# Patient Record
Sex: MALE | Race: WHITE | NOT HISPANIC OR LATINO | Employment: OTHER | URBAN - METROPOLITAN AREA
[De-identification: names, ages, dates, MRNs, and addresses within clinical notes are randomized per-mention and may not be internally consistent; named-entity substitution may affect disease eponyms.]

---

## 2018-01-09 ENCOUNTER — OFFICE VISIT (OUTPATIENT)
Dept: URGENT CARE | Facility: CLINIC | Age: 68
End: 2018-01-09
Payer: MEDICARE

## 2018-01-09 VITALS
HEART RATE: 91 BPM | WEIGHT: 195 LBS | SYSTOLIC BLOOD PRESSURE: 145 MMHG | HEIGHT: 71 IN | TEMPERATURE: 99 F | BODY MASS INDEX: 27.3 KG/M2 | DIASTOLIC BLOOD PRESSURE: 75 MMHG | RESPIRATION RATE: 18 BRPM | OXYGEN SATURATION: 97 %

## 2018-01-09 DIAGNOSIS — R52 BODY ACHES: Primary | ICD-10-CM

## 2018-01-09 DIAGNOSIS — J01.90 ACUTE BACTERIAL SINUSITIS: ICD-10-CM

## 2018-01-09 DIAGNOSIS — B96.89 ACUTE BACTERIAL SINUSITIS: ICD-10-CM

## 2018-01-09 LAB
CTP QC/QA: YES
FLUAV AG NPH QL: NEGATIVE
FLUBV AG NPH QL: NEGATIVE

## 2018-01-09 PROCEDURE — 99213 OFFICE O/P EST LOW 20 MIN: CPT | Mod: 25,S$GLB,, | Performed by: INTERNAL MEDICINE

## 2018-01-09 PROCEDURE — 96372 THER/PROPH/DIAG INJ SC/IM: CPT | Mod: S$GLB,,, | Performed by: INTERNAL MEDICINE

## 2018-01-09 PROCEDURE — 87804 INFLUENZA ASSAY W/OPTIC: CPT | Mod: QW,S$GLB,, | Performed by: INTERNAL MEDICINE

## 2018-01-09 RX ORDER — BETAMETHASONE SODIUM PHOSPHATE AND BETAMETHASONE ACETATE 3; 3 MG/ML; MG/ML
9 INJECTION, SUSPENSION INTRA-ARTICULAR; INTRALESIONAL; INTRAMUSCULAR; SOFT TISSUE ONCE
Status: COMPLETED | OUTPATIENT
Start: 2018-01-09 | End: 2018-01-09

## 2018-01-09 RX ORDER — AMOXICILLIN AND CLAVULANATE POTASSIUM 875; 125 MG/1; MG/1
1 TABLET, FILM COATED ORAL EVERY 12 HOURS
Qty: 20 TABLET | Refills: 0 | Status: SHIPPED | OUTPATIENT
Start: 2018-01-09 | End: 2018-01-19

## 2018-01-09 RX ADMIN — BETAMETHASONE SODIUM PHOSPHATE AND BETAMETHASONE ACETATE 9 MG: 3; 3 INJECTION, SUSPENSION INTRA-ARTICULAR; INTRALESIONAL; INTRAMUSCULAR; SOFT TISSUE at 07:01

## 2018-01-10 NOTE — PROGRESS NOTES
"Subjective:       Patient ID: Parvez Rossi is a 67 y.o. male.    Vitals:  height is 5' 11" (1.803 m) and weight is 88.5 kg (195 lb). His temperature is 98.5 °F (36.9 °C). His blood pressure is 145/75 (abnormal) and his pulse is 91. His respiration is 18 and oxygen saturation is 97%.     Chief Complaint: Sinus Problem    Sinus Problem   This is a new problem. The current episode started in the past 7 days. The problem has been gradually worsening since onset. There has been no fever. The pain is mild. Associated symptoms include chills, congestion, coughing, headaches, sinus pressure and sneezing. Pertinent negatives include no ear pain, hoarse voice, shortness of breath or sore throat. Past treatments include oral decongestants. The treatment provided mild relief.     Review of Systems   Constitution: Positive for chills and malaise/fatigue. Negative for fever.   HENT: Positive for congestion, sinus pressure and sneezing. Negative for ear pain, hoarse voice and sore throat.    Eyes: Negative for discharge and redness.   Cardiovascular: Negative for chest pain, dyspnea on exertion and leg swelling.   Respiratory: Positive for cough. Negative for shortness of breath, sputum production and wheezing.    Musculoskeletal: Positive for joint pain and myalgias.   Gastrointestinal: Negative for abdominal pain and nausea.   Neurological: Positive for headaches.       Objective:      Physical Exam   Constitutional: He appears well-developed and well-nourished.   HENT:   Head: Normocephalic and atraumatic.   Nose: Mucosal edema (purulent discharge) present.   Eyes: Conjunctivae and EOM are normal. Pupils are equal, round, and reactive to light.   Neck: Normal range of motion. Neck supple.   Cardiovascular: Normal rate and regular rhythm.    Pulmonary/Chest: Effort normal and breath sounds normal.   Vitals reviewed.     Assessment:       1. Body aches    2. Acute bacterial sinusitis        Plan:         Body aches  -     POCT " Influenza A/B    Acute bacterial sinusitis  -     betamethasone acetate-betamethasone sodium phosphate injection 9 mg; Inject 1.5 mLs (9 mg total) into the muscle once.  -     amoxicillin-clavulanate 875-125mg (AUGMENTIN) 875-125 mg per tablet; Take 1 tablet by mouth every 12 (twelve) hours.  Dispense: 20 tablet; Refill: 0

## 2019-03-11 DIAGNOSIS — S16.1XXA CERVICAL STRAIN: Primary | ICD-10-CM

## 2019-03-18 ENCOUNTER — CLINICAL SUPPORT (OUTPATIENT)
Dept: REHABILITATION | Facility: HOSPITAL | Age: 69
End: 2019-03-18
Payer: MEDICARE

## 2019-03-18 DIAGNOSIS — M54.2 NECK PAIN: ICD-10-CM

## 2019-03-18 DIAGNOSIS — R29.3 POOR POSTURE: ICD-10-CM

## 2019-03-18 PROCEDURE — 97110 THERAPEUTIC EXERCISES: CPT | Mod: PO

## 2019-03-18 PROCEDURE — 97161 PT EVAL LOW COMPLEX 20 MIN: CPT | Mod: PO

## 2019-03-18 PROCEDURE — G8990 OTHER PT/OT CURRENT STATUS: HCPCS | Mod: CL,PO

## 2019-03-18 PROCEDURE — 97140 MANUAL THERAPY 1/> REGIONS: CPT | Mod: PO

## 2019-03-18 PROCEDURE — G8991 OTHER PT/OT GOAL STATUS: HCPCS | Mod: CI,PO

## 2019-03-18 NOTE — PLAN OF CARE
Physical Therapy Initial Evaluation     Name: Parvez Rossi  Clinic Number: [id number]    Diagnosis:   Encounter Diagnoses   Name Primary?    Poor posture     Neck pain      Physician: Shorty Lopez MD  Treatment Orders: PT Eval and Treat  Past Medical History:   Diagnosis Date    Diabetes mellitus     Diabetes mellitus, type 2     Hyperlipidemia     Hypertension       Current Outpatient Medications   Medication Sig    fenofibrate (TRIGLIDE) 50 MG tablet Take 160 mg by mouth once daily.     fish oil-omega-3 fatty acids 300-1,000 mg capsule Take 2 g by mouth once daily.    ibuprofen (ADVIL,MOTRIN) 200 MG tablet Take 200 mg by mouth every 6 (six) hours as needed for Pain.    insulin aspart (NOVOLOG) 100 unit/mL injection Inject 70 Units into the skin 3 (three) times daily before meals.    insulin glargine (LANTUS) 100 unit/mL injection Inject 50 Units into the skin every evening.    irbesartan (AVAPRO) 75 MG tablet Take 300 mg by mouth every evening.     metformin (GLUCOPHAGE) 500 MG tablet Take 1,000 mg by mouth 4 (four) times daily.     ondansetron (ZOFRAN-ODT) 4 MG TbDL Take 2 tablets (8 mg total) by mouth every 8 (eight) hours as needed.    oxycodone-acetaminophen (PERCOCET)  mg per tablet Take 1 tablet by mouth every 4 to 6 hours as needed.     No current facility-administered medications for this visit.      Review of patient's allergies indicates:  No Known Allergies    Evaluation Date: 3-18-19  Visit # authorized: 1  Authorization period: 3-11-19 to 3-10-20  Plan of care Expiration: 5-18-19  Evaluation Time in/Out: see POC    Subjective     History of condition/Onset Date:  5 wks ago  Says he has been working out with a  for the past 3 years; started Shortly after he had his R shoulder surgery. Notes one day he was doing side planks with his , 30 sec holds, which felt fine at the time. the next day,  however, his R shoulder and whole R side of his neck was super sore. Says the pain didn't go away for about 5 days so he went to see his previous shoulder doctor. Was given cortisone injection and pain pills, none of which really helped.  Says the next week he was having R shoulder aching again, R UE numbness and severe chest pain.  Went to see his cardiologist. (this was about 3 wks ago) had an EKG which ruled out any heart event.  Went back to orthopedist again who prescribed the PT and he is getting an MRI today. Will follow up with orthopedist later this week.  Did not take any medication this morning prior to PT eval bc he didn't want to mask his symptoms.    PRECAUTIONS:  Primary complaint: R sided neck and shoulder pain  Current Functional Limitations: pain in R shoulder and neck during majority of ADLs, pain when raising arms up,   Prior Functional Status:  Easing factors:  Tramdol, advil, requires daily.  Prior Treatment: none, PT for R shoulder post RC/SAD surgery  Occupation:  Retired, used to do .                       Pain Scale 0-10:  Current: 5/10     Best: 0/10      Worst: 8/10  Pts goals:  Relieve R shoulder and neck pain    Objective     Observation/Posture: rounded shoulders, slight FHP    Palpation: significant TTP of B upper traps and levator scap (R>L), minimal TTP of suboccipitals, hypertonic  Alignment: pt tends to rotate and SB to the right when relaxed in supine. Some difficulty relaxing his head to allow for manual techs.  Comfort with gentle cervical distraction.   Hypertonic pecs  Minimal TTP of RC insertions on R side.      Cervical AROM  Motion degrees tolerance   R sidebending 10 deg  *Sig pain in R side of neck   L sidebending 20 deg *pulling pain in R side of neck   R rotation 40 deg    L rotation 35 deg    Flexion/extension 25 deg/30 Most pain with flexion       AROM Right Left Comment Normal   Shoulder Flexion: 116 degrees 135 degrees Feels better in this  position 180 deg   Shoulder Extension: WFL WFL  60 deg   Shoulder Abduction: 109 degrees 140 degrees  180 deg   Shoulder ER: T2 T4  90 deg   Shoulder IR: L1 T10 No pain, just stifffness 90 deg       MMT   Right Left Comment   Shoulder flexion: 3/5 4-/5 Pain in R UE with all MMTs   Shoulder Abduction: 3/5 4/5    Shoulder ER: 4-/5 4+/5    Shoulder IR: 4-/5 4+/5    Lower Trap: 3+/5 4/5    Middle Trap: 3+/5 4/5      Special Tests    - Neers: right negative  - Casanova-Alex: right negative  - Speeds: right negative  - Apprehension/Relocation: right negative    Pt/family was provided educational information, including: role of PT, goals for PT, scheduling - pt verbalized understanding.   Discussed insurance limitations with pt.     Functional Limitations Reports - G Codes  Category: Other PT    Tool: FOTO Cervical Survey   Modifier  Impairment Limitation Restriction    CH  0 % impaired, limited or restricted    CI  @ least 1% but less than 20% impaired, limited or restricted    CJ  @ least 20%<40% impaired, limited or restricted    CK  @ least 40%<60% impaired, limited or restricted    CL  @ least 60% <80% impaired, limited or restricted    CM  @ least 80%<100% impaired limited or restricted    CN  100% impaired, limited or restricted     Current/: CL = 65% Limitation   Goal/ : CI = 10% Limitation      History  Co-morbidities and personal factors that may impact the plan of care Co-morbidities:   diabetes, difficulty sleeping, high BMI and HTN    Personal Factors:   no deficits     low   Examination  Body Structures and Functions, activity limitations and participation restrictions that may impact the plan of care Body Regions:   neck  upper extremities    Body Systems:    gross symmetry  ROM  strength  gross coordinated movement    Participation Restrictions:   none    Activity limitations:   Learning and applying knowledge  no deficits    General Tasks and Commands  no deficits    Communication  no  deficits    Mobility  lifting and carrying objects    Self care  no deficits     Domestic Life  no deficits    Interactions/Relationships  no deficits    Life Areas  no deficits    Community and Social Life  no deficits         low   Clinical Presentation stable and uncomplicated low   Decision Making/ Complexity Score: low         TREATMENT     Time In: 0830  Time Out: 0900    PT Evaluation Completed? Yes  Educated pt on treatment goals and plan of care. Pt demo good understanding along with good return demonstration of home exercise program.      Parvez received 15 minutes of therapeutic exercise & instruction to improve LOF per flowsheet.   - HEP completed in full    NEXT VISIT:  - start with MHP to neck for 8mins prior to MT.  - supine pec stretching, advance to doorway or corner per pt tolerance.  - wall angels or ball roll ups to stretch t-spine      Parvez received 10 minutes of manual therapy including:  - grade 2-3 manual cervical distraction  - STM/TPR throughout neck and R side of neck/shoulder region      Access Code: [email or web address]   Supine Thoracic Mobilization Towel Roll Vertical with Arm Stretch - 10 reps - 10 hold - 1x daily  Supine Cervical Rotation AROM on Pillow - 10 reps - 5 hold - 2x daily  Standing Scapular Retraction - 10 reps - 10 hold - 2x daily  Patient Education  Forward Head Posture  Written Home Exercises Provided: Yes  Parvez verbalized good understanding of the education provided.   Patient demo good return demo with skill during exercises.A    Assessment     Patient displays limitations in cervicothoracic muscle tension and joint mobility. Likely strained during one of his workouts or due to general postural impairments. Pt displayed good motivation to comply with POC and responded well to today's interventions.  Pt will benefit from skilled outpatient physical therapy to address the above stated deficits, provide pt/family education and to maximize pt's level of independence.  Pt  prognosis is Excellent.      Medical necessity is demonstrated by the following IMPAIRMENTS/PROBLEMS:  - Pain limiting function  - Decreased Segmental Mobility & Decreased ROM  - Decreased Core & B UE strength  - Decreased Flexibility of neck and R UE  - Decreased Tolerance to Functional Activities  - Inability to participate in vocational pursuits  - Requires skilled supervision to complete and progress HEP    Pt's spiritual, cultural and educational needs considered and pt agreeable to plan of care and goals as stated below:     Anticipated Barriers for physical therapy: none    Short Term GOALS:  1. Pt to report average pain <5/10 with regular ADLs.   2. Pt to be able to sleep >4hrs without interruption due to pain.  3. Pt to begin HEP including specific stretching and strengthening as per their diagnosis to decrease pain and improve flexibility.  4. Pt to display >60deg cervical rotation and improvement in cervical sidebending (either in range or tolerance without pain)    Long Term GOALS:   1. Pt to report <2/10, no longer limiting functional activities.  2. Pt to report return to >75% of regular ADLs, work and recreational tasks.   3. Pt to be compliant in advanced HEP to maintain progress gained in therapy thus far.   4. Pt to display >4+/5 gross MMT for B UE and trunk in order to support head and neck throughout ADLs.   5. Pt to report resolution of headaches.    PLAN     Outpatient physical therapy 1-2 times per week x 8 weeks to include: patient education, therapeutic exercises, neuromuscular re-education, pain management/modalities prn, joint mobilizations, and regular update of pt's home exercise program.  Pt may be seen by PTA as part of the rehabilitation team.     Di Cueva DPT

## 2019-03-18 NOTE — PROGRESS NOTES
Physical Therapy Initial Evaluation     Name: Parvez Rossi  Clinic Number: [id number]    Diagnosis:   Encounter Diagnoses   Name Primary?    Poor posture     Neck pain      Physician: Shorty Lopez MD  Treatment Orders: PT Eval and Treat  Past Medical History:   Diagnosis Date    Diabetes mellitus     Diabetes mellitus, type 2     Hyperlipidemia     Hypertension       Current Outpatient Medications   Medication Sig    fenofibrate (TRIGLIDE) 50 MG tablet Take 160 mg by mouth once daily.     fish oil-omega-3 fatty acids 300-1,000 mg capsule Take 2 g by mouth once daily.    ibuprofen (ADVIL,MOTRIN) 200 MG tablet Take 200 mg by mouth every 6 (six) hours as needed for Pain.    insulin aspart (NOVOLOG) 100 unit/mL injection Inject 70 Units into the skin 3 (three) times daily before meals.    insulin glargine (LANTUS) 100 unit/mL injection Inject 50 Units into the skin every evening.    irbesartan (AVAPRO) 75 MG tablet Take 300 mg by mouth every evening.     metformin (GLUCOPHAGE) 500 MG tablet Take 1,000 mg by mouth 4 (four) times daily.     ondansetron (ZOFRAN-ODT) 4 MG TbDL Take 2 tablets (8 mg total) by mouth every 8 (eight) hours as needed.    oxycodone-acetaminophen (PERCOCET)  mg per tablet Take 1 tablet by mouth every 4 to 6 hours as needed.     No current facility-administered medications for this visit.      Review of patient's allergies indicates:  No Known Allergies    Evaluation Date: 3-18-19  Visit # authorized: 1  Authorization period: 3-11-19 to 3-10-20  Plan of care Expiration: 5-18-19  Evaluation Time in/Out: see POC    Subjective     History of condition/Onset Date:  5 wks ago  Says he has been working out with a  for the past 3 years; started Shortly after he had his R shoulder surgery. Notes one day he was doing side planks with his , 30 sec holds, which felt fine at the time. the next day,  however, his R shoulder and whole R side of his neck was super sore. Says the pain didn't go away for about 5 days so he went to see his previous shoulder doctor. Was given cortisone injection and pain pills, none of which really helped.  Says the next week he was having R shoulder aching again, R UE numbness and severe chest pain.  Went to see his cardiologist. (this was about 3 wks ago) had an EKG which ruled out any heart event.  Went back to orthopedist again who prescribed the PT and he is getting an MRI today. Will follow up with orthopedist later this week.  Did not take any medication this morning prior to PT eval bc he didn't want to mask his symptoms.    PRECAUTIONS:  Primary complaint: R sided neck and shoulder pain  Current Functional Limitations: pain in R shoulder and neck during majority of ADLs, pain when raising arms up,   Prior Functional Status:  Easing factors:  Tramdol, advil, requires daily.  Prior Treatment: none, PT for R shoulder post RC/SAD surgery  Occupation:  Retired, used to do .                       Pain Scale 0-10:  Current: 5/10     Best: 0/10      Worst: 8/10  Pts goals:  Relieve R shoulder and neck pain    Objective     Observation/Posture: rounded shoulders, slight FHP    Palpation: significant TTP of B upper traps and levator scap (R>L), minimal TTP of suboccipitals, hypertonic  Alignment: pt tends to rotate and SB to the right when relaxed in supine. Some difficulty relaxing his head to allow for manual techs.  Comfort with gentle cervical distraction.   Hypertonic pecs  Minimal TTP of RC insertions on R side.      Cervical AROM  Motion degrees tolerance   R sidebending 10 deg  *Sig pain in R side of neck   L sidebending 20 deg *pulling pain in R side of neck   R rotation 40 deg    L rotation 35 deg    Flexion/extension 25 deg/30 Most pain with flexion       AROM Right Left Comment Normal   Shoulder Flexion: 116 degrees 135 degrees Feels better in this  position 180 deg   Shoulder Extension: WFL WFL  60 deg   Shoulder Abduction: 109 degrees 140 degrees  180 deg   Shoulder ER: T2 T4  90 deg   Shoulder IR: L1 T10 No pain, just stifffness 90 deg       MMT   Right Left Comment   Shoulder flexion: 3/5 4-/5 Pain in R UE with all MMTs   Shoulder Abduction: 3/5 4/5    Shoulder ER: 4-/5 4+/5    Shoulder IR: 4-/5 4+/5    Lower Trap: 3+/5 4/5    Middle Trap: 3+/5 4/5      Special Tests    - Neers: right negative  - Casanova-Alex: right negative  - Speeds: right negative  - Apprehension/Relocation: right negative    Pt/family was provided educational information, including: role of PT, goals for PT, scheduling - pt verbalized understanding.   Discussed insurance limitations with pt.     Functional Limitations Reports - G Codes  Category: Other PT    Tool: FOTO Cervical Survey   Modifier  Impairment Limitation Restriction    CH  0 % impaired, limited or restricted    CI  @ least 1% but less than 20% impaired, limited or restricted    CJ  @ least 20%<40% impaired, limited or restricted    CK  @ least 40%<60% impaired, limited or restricted    CL  @ least 60% <80% impaired, limited or restricted    CM  @ least 80%<100% impaired limited or restricted    CN  100% impaired, limited or restricted     Current/: CL = 65% Limitation   Goal/ : CI = 10% Limitation      History  Co-morbidities and personal factors that may impact the plan of care Co-morbidities:   diabetes, difficulty sleeping, high BMI and HTN    Personal Factors:   no deficits     low   Examination  Body Structures and Functions, activity limitations and participation restrictions that may impact the plan of care Body Regions:   neck  upper extremities    Body Systems:    gross symmetry  ROM  strength  gross coordinated movement    Participation Restrictions:   none    Activity limitations:   Learning and applying knowledge  no deficits    General Tasks and Commands  no deficits    Communication  no  deficits    Mobility  lifting and carrying objects    Self care  no deficits     Domestic Life  no deficits    Interactions/Relationships  no deficits    Life Areas  no deficits    Community and Social Life  no deficits         low   Clinical Presentation stable and uncomplicated low   Decision Making/ Complexity Score: low         TREATMENT     Time In: 0830  Time Out: 0900    PT Evaluation Completed? Yes  Educated pt on treatment goals and plan of care. Pt demo good understanding along with good return demonstration of home exercise program.      Parvez received 15 minutes of therapeutic exercise & instruction to improve LOF per flowsheet.   - HEP completed in full    NEXT VISIT:  - start with MHP to neck for 8mins prior to MT.  - supine pec stretching, advance to doorway or corner per pt tolerance.  - wall angels or ball roll ups to stretch t-spine      Parvez received 10 minutes of manual therapy including:  - grade 2-3 manual cervical distraction  - STM/TPR throughout neck and R side of neck/shoulder region      Access Code: [email or web address]   Supine Thoracic Mobilization Towel Roll Vertical with Arm Stretch - 10 reps - 10 hold - 1x daily  Supine Cervical Rotation AROM on Pillow - 10 reps - 5 hold - 2x daily  Standing Scapular Retraction - 10 reps - 10 hold - 2x daily  Patient Education  Forward Head Posture  Written Home Exercises Provided: Yes  Parvez verbalized good understanding of the education provided.   Patient demo good return demo with skill during exercises.A    Assessment     Patient displays limitations in cervicothoracic muscle tension and joint mobility. Likely strained during one of his workouts or due to general postural impairments. Pt displayed good motivation to comply with POC and responded well to today's interventions.  Pt will benefit from skilled outpatient physical therapy to address the above stated deficits, provide pt/family education and to maximize pt's level of independence.  Pt  prognosis is Excellent.      Medical necessity is demonstrated by the following IMPAIRMENTS/PROBLEMS:  - Pain limiting function  - Decreased Segmental Mobility & Decreased ROM  - Decreased Core & B UE strength  - Decreased Flexibility of neck and R UE  - Decreased Tolerance to Functional Activities  - Inability to participate in vocational pursuits  - Requires skilled supervision to complete and progress HEP    Pt's spiritual, cultural and educational needs considered and pt agreeable to plan of care and goals as stated below:     Anticipated Barriers for physical therapy: none    Short Term GOALS:  1. Pt to report average pain <5/10 with regular ADLs.   2. Pt to be able to sleep >4hrs without interruption due to pain.  3. Pt to begin HEP including specific stretching and strengthening as per their diagnosis to decrease pain and improve flexibility.  4. Pt to display >60deg cervical rotation and improvement in cervical sidebending (either in range or tolerance without pain)    Long Term GOALS:   1. Pt to report <2/10, no longer limiting functional activities.  2. Pt to report return to >75% of regular ADLs, work and recreational tasks.   3. Pt to be compliant in advanced HEP to maintain progress gained in therapy thus far.   4. Pt to display >4+/5 gross MMT for B UE and trunk in order to support head and neck throughout ADLs.   5. Pt to report resolution of headaches.    PLAN     Outpatient physical therapy 1-2 times per week x 8 weeks to include: patient education, therapeutic exercises, neuromuscular re-education, pain management/modalities prn, joint mobilizations, and regular update of pt's home exercise program.  Pt may be seen by PTA as part of the rehabilitation team.     Di Cueva DPT

## 2019-03-22 ENCOUNTER — CLINICAL SUPPORT (OUTPATIENT)
Dept: REHABILITATION | Facility: HOSPITAL | Age: 69
End: 2019-03-22
Payer: MEDICARE

## 2019-03-22 DIAGNOSIS — R29.3 POOR POSTURE: ICD-10-CM

## 2019-03-22 DIAGNOSIS — M54.2 NECK PAIN: ICD-10-CM

## 2019-03-22 PROCEDURE — 97010 HOT OR COLD PACKS THERAPY: CPT | Mod: PO

## 2019-03-22 PROCEDURE — 97110 THERAPEUTIC EXERCISES: CPT | Mod: PO

## 2019-03-22 PROCEDURE — 97140 MANUAL THERAPY 1/> REGIONS: CPT | Mod: PO

## 2019-03-22 NOTE — PROGRESS NOTES
Physical Therapy Daily Treatment Note     Name: Parvez Rossi  Clinic Number: [id number]    Therapy Diagnosis:   Encounter Diagnoses   Name Primary?    Poor posture     Neck pain      Physician: Shorty Lopez MD    Visit Date: 3/22/2019  Medical Diagnosis: neck pain  Evaluation Date: 3-18-19  [place] due: 4-18-19  Authorization Period Expiration: 3-10-20  Plan of Care Certification Period: 5-18-19      Visit #/Visits authorized: 2/ pending    Time In: 0800  Time Out: 0900  Total Billable Time: 60 minutes  MT  TE  MHP    Precautions: Standard    Subjective     Pt reports: he had the MRIs done of his R shoulder and neck. His doctor reviewed them and recommended an epidural which he has not scheduled and doesn't intend to since he is feeling so much better already with therapy.    cervical MRI from 3-19-19:  Spondylosis, disc protrusion at C6-7 and C5-6  Moderate canal stenosis with cord compression deformity  B neural foraminal stenosis which is severe at C6-7, moderate at C5-6  - small disc protrusion at C4-5 with mild canal and R foraminal stenosis  Mild destroscoliosis.    R shoulder MRI from 3-19-19:  - previous RC repair with lateral anchors  - re-tear of supraspinatus, 5mm at ant rim  - tendinosis of infraspinatus  - maceration of the labrum with SLAP variant        He was compliant with home exercise program.    Patient reports their pain to be 0/10 on a 0-10 scale with 0 being no pain and 10 being the worst pain imaginable.    Functional change: notes he is feeling so much better since starting PT. Doesn't feel the need to have an epidural to his neck as the doctor suggested.      Objective   Palpation: significant TTP of B upper traps and levator scap (R>L), minimal TTP of suboccipitals, this has improved as compared to tissue quality at eval.    Alignment: pt tends to rotate and SB to the right when relaxed in supine. Some difficulty relaxing his head to allow for manual techs.-- improving  Comfort with  gentle cervical distraction.   Hypertonic pecs    Alphonse received therapeutic exercises per flowsheet to develop endurance, ROM, flexibility and posture for 15 minutes.  - HEP reviewed in full  - sitting thoracic stretching in bronze chair 10x10    OTB rows 3x10 (added to HEP)  - Upper thoracic stretch 4x10 (added to HEP)  - Scap pinches with FR vertical behind spine at wall.    NEXT VISIT:  - supine pec stretching, advance to doorway or corner per pt tolerance.  - wall angels or ball roll ups to stretch t-spine   - serratus punching  in supine 3# stick  - Standing cane ABD 10 x20     MHP applied for 10 mins prior to MT to enhance benefits.  Alphonse received the following manual therapy techniques: Joint mobilizations, Manual traction and Myofacial release were applied to the: neck and R shoulder for 15 minutes, including:  R shoulder:  - post glides grad 3, MFR to R pec minor  - distraction of GH joint per pt tolerance  Neck:   - grade 2-3 manual cervical distraction  - STM/TPR throughout neck and R side of neck/shoulder region   - scapular depression     Access Code: [email or web address]         Supine Thoracic Mobilization Towel Roll Vertical with Arm Stretch - 10 reps - 10 hold - 1x daily  Supine Cervical Rotation AROM on Pillow - 10 reps - 5 hold - 2x daily  Standing Scapular Retraction - 10 reps - 10 hold - 2x daily  Patient Education  Forward Head Posture    Written Home Exercises Provided: Patient instructed to cont prior HEP.  Exercises were reviewed and Alphonse was able to demonstrate them prior to the end of the session.  Alphonse demonstrated good  understanding of the education provided.       Assessment     Pt with great tolerance to activities today. Felt appropriate muscles activation and stretching. Felt good at end of session, no increase in pain.  Alphonse is progressing well towards his goals.   Pt prognosis is Excellent.     Pt will continue to benefit from skilled outpatient physical therapy to address the  deficits listed in the problem list box on initial evaluation, provide pt/family education and to maximize pt's level of independence in the home and community environment.     Pt's spiritual, cultural and educational needs considered and pt agreeable to plan of care and goals.    Anticipated barriers to physical therapy: none    Goals:   Short Term GOALS:  1. Pt to report average pain <5/10 with regular ADLs.   2. Pt to be able to sleep >4hrs without interruption due to pain.  3. Pt to begin HEP including specific stretching and strengthening as per their diagnosis to decrease pain and improve flexibility.  4. Pt to display >60deg cervical rotation and improvement in cervical sidebending (either in range or tolerance without pain)     Long Term GOALS:   1. Pt to report <2/10, no longer limiting functional activities.  2. Pt to report return to >75% of regular ADLs, work and recreational tasks.   3. Pt to be compliant in advanced HEP to maintain progress gained in therapy thus far.   4. Pt to display >4+/5 gross MMT for B UE and trunk in order to support head and neck throughout ADLs.   5. Pt to report resolution of headaches.    Plan   Cont per POC.    Di Cueva, PT

## 2019-03-27 ENCOUNTER — CLINICAL SUPPORT (OUTPATIENT)
Dept: REHABILITATION | Facility: HOSPITAL | Age: 69
End: 2019-03-27
Payer: MEDICARE

## 2019-03-27 DIAGNOSIS — M54.2 NECK PAIN: ICD-10-CM

## 2019-03-27 DIAGNOSIS — R29.3 POOR POSTURE: ICD-10-CM

## 2019-03-27 PROCEDURE — 97010 HOT OR COLD PACKS THERAPY: CPT | Mod: PO

## 2019-03-27 PROCEDURE — 97140 MANUAL THERAPY 1/> REGIONS: CPT | Mod: PO

## 2019-03-27 PROCEDURE — 97110 THERAPEUTIC EXERCISES: CPT | Mod: PO

## 2019-03-27 NOTE — PROGRESS NOTES
Physical Therapy Daily Treatment Note     Name: Parvez Rossi  Clinic Number: [id number]    Therapy Diagnosis:   Encounter Diagnoses   Name Primary?    Poor posture     Neck pain      Physician: Shorty Lopez MD    Visit Date: 3/27/2019  Medical Diagnosis: neck pain  Evaluation Date: 3-18-19  [place] due: 4-18-19  Authorization Period Expiration: 3-10-20  Plan of Care Certification Period: 5-18-19      Visit #/Visits authorized: 3/ pending    Time In: 1100  Time Out: 1200  Total Billable Time: 60 minutes  MT  TE  MHP    Precautions: Standard    Subjective     Pt reports:he felt great again after last session and called the doc to cancel his epidural appt.   cervical MRI from 3-19-19:  Spondylosis, disc protrusion at C6-7 and C5-6  Moderate canal stenosis with cord compression deformity  B neural foraminal stenosis which is severe at C6-7, moderate at C5-6  - small disc protrusion at C4-5 with mild canal and R foraminal stenosis  Mild destroscoliosis.    R shoulder MRI from 3-19-19:  - previous RC repair with lateral anchors  - re-tear of supraspinatus, 5mm at ant rim  - tendinosis of infraspinatus  - maceration of the labrum with SLAP variant    He was compliant with home exercise program.    Patient reports their pain to be 1/10 on a 0-10 scale with 0 being no pain and 10 being the worst pain imaginable.    Functional change: notes he is feeling so much better since starting PT. Doesn't feel the need to have an epidural to his neck as the doctor suggested.      Objective   Palpation: significant TTP of B upper traps and levator scap (R>L), minimal TTP of suboccipitals, this has improved as compared to tissue quality at eval.    Alignment: pt tends to rotate and SB to the right when relaxed in supine. Some difficulty relaxing his head to allow for manual techs.-- improving  Comfort with moderate cervical distraction.   Hypertonic beulah Cunha received therapeutic exercises per flowsheet to develop endurance, ROM,  "flexibility and posture for 23 minutes.  - supine overhead 3# red stick 10"x20  - supine T pec stretching 10x10"  - sitting thoracic stretching in bronze chair 10x10    OTB rows 3x10 (added to HEP)  - Upper thoracic stretch 4x10 (added to HEP)  - NP- Scap pinches with FR vertical behind spine at wall.    NEXT VISIT:  -  advance to doorway or corner per pt tolerance.  - wall angels or ball roll ups to stretch t-spine   - serratus punching  in supine 3# stick  - Standing cane ABD if stretches R shoulder   - sitting levator scap stretch  MHP applied for 10 mins prior to MT to enhance benefits.  Alphonse received the following manual therapy techniques: Joint mobilizations, Manual traction and Myofacial release were applied to the: neck and R shoulder for 15 minutes, including:  R shoulder:  - post glides grad 3, MFR to R pec minor  - distraction of GH joint per pt tolerance  Neck:   - grade 2-3 manual cervical distraction  - STM/TPR throughout neck and R side of neck/shoulder region   - scapular depression    Access Code: [email or web address]         Supine Thoracic Mobilization Towel Roll Vertical with Arm Stretch - 10 reps - 10 hold - 1x daily  Supine Cervical Rotation AROM on Pillow - 10 reps - 5 hold - 2x daily  Standing Scapular Retraction - 10 reps - 10 hold - 2x daily  Patient Education  Forward Head Posture    Written Home Exercises Provided: Patient instructed to cont prior HEP.  Exercises were reviewed and Alphonse was able to demonstrate them prior to the end of the session.  Alphonse demonstrated good  understanding of the education provided.       Assessment     Pt with god tolerance to tasks today. Displays improved postural awareness and scapular control during rows.  Pt with great tolerance to activities today. Felt appropriate muscles activation and stretching. Felt good at end of session, no increase in pain.  Alphonse is progressing well towards his goals.   Pt prognosis is Excellent.     Pt will continue to benefit from " skilled outpatient physical therapy to address the deficits listed in the problem list box on initial evaluation, provide pt/family education and to maximize pt's level of independence in the home and community environment.     Pt's spiritual, cultural and educational needs considered and pt agreeable to plan of care and goals.    Anticipated barriers to physical therapy: none    Goals:   Short Term GOALS:  1. Pt to report average pain <5/10 with regular ADLs.   2. Pt to be able to sleep >4hrs without interruption due to pain.  3. Pt to begin HEP including specific stretching and strengthening as per their diagnosis to decrease pain and improve flexibility.  4. Pt to display >60deg cervical rotation and improvement in cervical sidebending (either in range or tolerance without pain)     Long Term GOALS:   1. Pt to report <2/10, no longer limiting functional activities.  2. Pt to report return to >75% of regular ADLs, work and recreational tasks.   3. Pt to be compliant in advanced HEP to maintain progress gained in therapy thus far.   4. Pt to display >4+/5 gross MMT for B UE and trunk in order to support head and neck throughout ADLs.   5. Pt to report resolution of headaches.    Plan   Cont per POC.    Di Cueva, PT

## 2019-03-28 NOTE — PROGRESS NOTES
"  Physical Therapy Daily Treatment Note     Name: Parvez Rossi  Clinic Number: [id number]    Therapy Diagnosis:   Encounter Diagnoses   Name Primary?    Poor posture     Neck pain      Physician: Shorty Lopez MD    Visit Date: 3/29/2019    Physician Orders: PT Eval and Treat  Medical Diagnosis: neck pain  Evaluation Date: 3/18/19  Authorization Period Expiration: 3/10/20  Plan of Care Certification Period: 5/18/19  Visit #/Visits authorized: 4/ -    Time In: 11:00 pm  Time Out: 12:00 pm  Total Billable Time: 30 minutes (TE-1, MT-1)     Precautions: Standard    Subjective     Pt reports: he woke up with increased pain in his right shoulder that goes up is neck, stating he must have slept wrong on his shoulder.   He was compliant with home exercise program.  Response to previous treatment: no adverse effects  Functional change: none    Pain: 3/10  Location: right shoulder and neck    Objective     MHP applied for 10 mins prior to MT to enhance benefits.    Alphonse received the following manual therapy techniques: Joint mobilizations, Manual traction and Myofacial release were applied to the: neck and R shoulder for 10 minutes, including:  R shoulder:  - post glides grad 2-3, MFR to R pec minor  - distraction of GH joint per pt tolerance  Neck:   - gentle cervical distraction   - STM/TPR throughout neck and R side of neck/shoulder region   - scapular depression      Alphonse received therapeutic exercises to develop strength, endurance, ROM and posture for 40 minutes including:    - supine overhead 3# red stick 10"x20  - supine T pec stretching over towel roll 10x10"  - supine serratus punches c/ 3# dowel : 3x10  - sitting thoracic stretching in bronze chair 10x10 - NP  -Supine thoracic extensions over half foam: 3'  - Seated UT stretch: 3x30"   - OTB rows 3x10   - Upper thoracic stretch 4x10 - NP  - Scap pinches with FR vertical behind spine at wall.  - Wall angels : x15    NEXT VISIT:  -  advance to doorway or " "corner per pt tolerance.  - Standing cane ABD if stretches R shoulder    Home Exercises Provided and Patient Education Provided     Education provided:   - Pt instructed to continue prior HEP and addition of new HEP provided today    Written Home Exercises Provided: yes.  Exercises were reviewed and Alphonse was able to demonstrate them prior to the end of the session.  Alphonse demonstrated good  understanding of the education provided.     See EMR under Patient Instructions for exercises provided 3/29/2019.    Assessment     Pt was able to complete and progress today with no increased pain or adverse effects. Pt demonstrated some tightness and restrictions with wall angels and with thoracic extensions , although with subjective report of them "feeling good".  Pt reports feeling much better and having decreased pain (1/10) towards completion.   Alphonse is progressing well towards his goals.   Pt prognosis is Excellent.     Pt will continue to benefit from skilled outpatient physical therapy to address the deficits listed in the problem list box on initial evaluation, provide pt/family education and to maximize pt's level of independence in the home and community environment.   Pt's spiritual, cultural and educational needs considered and pt agreeable to plan of care and goals.    Anticipated barriers to physical therapy: none    Goals:   Short Term GOALS:  1. Pt to report average pain <5/10 with regular ADLs.   2. Pt to be able to sleep >4hrs without interruption due to pain.  3. Pt to begin HEP including specific stretching and strengthening as per their diagnosis to decrease pain and improve flexibility.  4. Pt to display >60deg cervical rotation and improvement in cervical sidebending (either in range or tolerance without pain)     Long Term GOALS:   1. Pt to report <2/10, no longer limiting functional activities.  2. Pt to report return to >75% of regular ADLs, work and recreational tasks.   3. Pt to be compliant in " advanced HEP to maintain progress gained in therapy thus far.   4. Pt to display >4+/5 gross MMT for B UE and trunk in order to support head and neck throughout ADLs.   5. Pt to report resolution of headaches.    Plan     Continue POC. Progress as tolerated.    Emeli Beltran, PTA

## 2019-03-29 ENCOUNTER — CLINICAL SUPPORT (OUTPATIENT)
Dept: REHABILITATION | Facility: HOSPITAL | Age: 69
End: 2019-03-29
Payer: MEDICARE

## 2019-03-29 DIAGNOSIS — M54.2 NECK PAIN: ICD-10-CM

## 2019-03-29 DIAGNOSIS — R29.3 POOR POSTURE: ICD-10-CM

## 2019-03-29 PROCEDURE — 97110 THERAPEUTIC EXERCISES: CPT | Mod: PO

## 2019-03-29 PROCEDURE — 97140 MANUAL THERAPY 1/> REGIONS: CPT | Mod: PO

## 2019-04-02 NOTE — PROGRESS NOTES
"  Physical Therapy Daily Treatment Note     Name: Parvez Rossi  Clinic Number: [id number]    Therapy Diagnosis:   Encounter Diagnoses   Name Primary?    Poor posture     Neck pain      Physician: Shorty Lopez MD    Visit Date: 4/3/2019    Physician Orders: PT Eval and Treat  Medical Diagnosis: neck pain  Evaluation Date: 3/18/19  Authorization Period Expiration: 3/10/20  Plan of Care Certification Period: 5/18/19  Visit #/Visits authorized: 5/20    Time In: 10:08 am  Time Out: 11:02 am   Total Billable Time: 54 minutes (TE-3, MT-1)    Precautions: Standard    Subjective     Pt reports: he is feeling alot better today with very minimal pain   He was compliant with home exercise program.  Response to previous treatment: no adverse effects  Functional change: none    Pain: 0.5/10  Location: right shoulder and neck    Objective     MHP applied for 10 mins prior to MT to enhance benefits.- NP    Alphonse received the following manual therapy techniques: Joint mobilizations, Manual traction and Myofacial release were applied to the: neck and R shoulder for 15 minutes, including:  R shoulder:  - post glides grad 2-3, MFR to R pec minor  - distraction of GH joint per pt tolerance  Neck:   - gentle cervical distraction   - STM/TPR throughout neck and R side of neck/shoulder region   - scapular depression    Alphonse received therapeutic exercises to develop strength, endurance, ROM and posture for 40 minutes including:    - supine overhead 3# red stick 10"x20  - supine T pec stretching over towel roll 10x10"  - supine serratus punches c/ 3# dowel : 3x10  - Supine AA abduction c/ dowel: x15  - Thoracic rotations: x10 B  - sitting thoracic stretching in bronze chair 10x10 - NP  - Supine thoracic extensions over half foam: 3'  - Seated UT stretch: 3x30"   - OTB rows 3x10   - Upper thoracic stretch 4x10 - NP  - Scap pinches with FR vertical behind spine at wall: 2x10  - Wall angels : x15  - Doorway stretch: 3x30"     Home " Exercises Provided and Patient Education Provided     Education provided:   - Pt instructed to continue prior HEP and addition of new HEP provided today    Written Home Exercises Provided: yes.  Exercises were reviewed and Alphonse was able to demonstrate them prior to the end of the session.  Alphonse demonstrated good  understanding of the education provided.     See EMR under Patient Instructions for exercises provided 3/29/2019.    Assessment     Pt continues to progress well and presents with decreased pain overall today. Pt denotes experiencing the most discomfort with full range overhead motions. Pt reports decreased pain towards completion.   Alphonse is progressing well towards his goals.   Pt prognosis is Excellent.     Pt will continue to benefit from skilled outpatient physical therapy to address the deficits listed in the problem list box on initial evaluation, provide pt/family education and to maximize pt's level of independence in the home and community environment.   Pt's spiritual, cultural and educational needs considered and pt agreeable to plan of care and goals.    Anticipated barriers to physical therapy: none    Goals:   Short Term GOALS:  1. Pt to report average pain <5/10 with regular ADLs.   2. Pt to be able to sleep >4hrs without interruption due to pain.  3. Pt to begin HEP including specific stretching and strengthening as per their diagnosis to decrease pain and improve flexibility.  4. Pt to display >60deg cervical rotation and improvement in cervical sidebending (either in range or tolerance without pain)     Long Term GOALS:   1. Pt to report <2/10, no longer limiting functional activities.  2. Pt to report return to >75% of regular ADLs, work and recreational tasks.   3. Pt to be compliant in advanced HEP to maintain progress gained in therapy thus far.   4. Pt to display >4+/5 gross MMT for B UE and trunk in order to support head and neck throughout ADLs.   5. Pt to report resolution of  headaches.    Plan     Continue POC. Progress as tolerated.    Emeli Beltran, PTA

## 2019-04-03 ENCOUNTER — CLINICAL SUPPORT (OUTPATIENT)
Dept: REHABILITATION | Facility: HOSPITAL | Age: 69
End: 2019-04-03
Payer: MEDICARE

## 2019-04-03 DIAGNOSIS — R29.3 POOR POSTURE: ICD-10-CM

## 2019-04-03 DIAGNOSIS — M54.2 NECK PAIN: ICD-10-CM

## 2019-04-03 PROCEDURE — 97140 MANUAL THERAPY 1/> REGIONS: CPT | Mod: PO

## 2019-04-03 PROCEDURE — 97110 THERAPEUTIC EXERCISES: CPT | Mod: PO

## 2019-04-04 NOTE — PROGRESS NOTES
"  Physical Therapy Daily Treatment Note     Name: Parvez Rossi  Clinic Number: [id number]    Therapy Diagnosis:   Encounter Diagnoses   Name Primary?    Poor posture     Neck pain      Physician: Shorty Lopez MD    Visit Date: 4/5/2019    Physician Orders: PT Eval and Treat  Medical Diagnosis: neck pain  Evaluation Date: 3/18/19  Authorization Period Expiration: 3/10/20  Plan of Care Certification Period: 5/18/19  Visit #/Visits authorized: 6/20    Time In: 10:00 am  Time Out: 10:55 am  Total Billable Time: 55 minutes (TE-3, MT-1)    Precautions: Standard    Subjective     Pt reports: he is continuing to feel better with very little pain. Pt stated he was sore the night of his last therapy session which improved and went away the next day.   He was compliant with home exercise program.  Response to previous treatment: " a little bit of soreness"   Functional change: none    Pain: 0.5 / 10  Location: right shoulder and neck    Objective     MHP applied for 10 mins prior to MT to enhance benefits.- NP    Alphonse received the following manual therapy techniques: Joint mobilizations, Manual traction and Myofacial release were applied to the: neck and R shoulder for 15 minutes, including:  R shoulder:  - post glides grad 2-3, MFR to R pec minor  - distraction of GH joint per pt tolerance  Neck:   - gentle cervical distraction   - STM/TPR throughout neck and R side of neck/shoulder region   - scapular depression    Alphonse received therapeutic exercises to develop strength, endurance, ROM and posture for 40 minutes including:    - supine overhead 3# red stick 10"x20  - supine T pec stretching over towel roll 10x10"  - supine serratus punches c/ 3# dowel : 3x10  - Supine AA abduction c/ dowel: x15  - Thoracic rotations: x15 B  - sitting thoracic stretching in bronze chair 10x10 - NP  - Supine thoracic extensions over half foam: 3'  - Seated UT stretch: 3x30"   - GTB rows 3x10   - Upper thoracic stretch 4x10 - NP  - " "Scap pinches with FR vertical behind spine at wall: 2x10  - Wall angels : x15  - Doorway stretch: 3x30"     Home Exercises Provided and Patient Education Provided     Education provided:   - Pt instructed to continue prior HEP     Written Home Exercises Provided: yes.  Exercises were reviewed and Alphonse was able to demonstrate them prior to the end of the session.  Alphonse demonstrated good  understanding of the education provided.     See EMR under Patient Instructions for exercises provided 3/29/2019.    Assessment     Pt continues to demonstrate decrease pain, good HEP compliance and good carryover with exercises with minimal to no cues required. Pt remains limited with wall angels demonstrating decreased ROM which is evident on the R compared to the left, although he is able to perform without symptom provocation. Will continue to progress as tolerated.   Alphonse is progressing well towards his goals.   Pt prognosis is Excellent.     Pt will continue to benefit from skilled outpatient physical therapy to address the deficits listed in the problem list box on initial evaluation, provide pt/family education and to maximize pt's level of independence in the home and community environment.   Pt's spiritual, cultural and educational needs considered and pt agreeable to plan of care and goals.    Anticipated barriers to physical therapy: none    Goals:   Short Term GOALS:  1. Pt to report average pain <5/10 with regular ADLs.   2. Pt to be able to sleep >4hrs without interruption due to pain.  3. Pt to begin HEP including specific stretching and strengthening as per their diagnosis to decrease pain and improve flexibility.  4. Pt to display >60deg cervical rotation and improvement in cervical sidebending (either in range or tolerance without pain)     Long Term GOALS:   1. Pt to report <2/10, no longer limiting functional activities.  2. Pt to report return to >75% of regular ADLs, work and recreational tasks.   3. Pt to " be compliant in advanced HEP to maintain progress gained in therapy thus far.   4. Pt to display >4+/5 gross MMT for B UE and trunk in order to support head and neck throughout ADLs.   5. Pt to report resolution of headaches.    Plan     Continue POC. Progress as tolerated.    Emeli Beltran, PTA

## 2019-04-05 ENCOUNTER — CLINICAL SUPPORT (OUTPATIENT)
Dept: REHABILITATION | Facility: HOSPITAL | Age: 69
End: 2019-04-05
Payer: MEDICARE

## 2019-04-05 DIAGNOSIS — R29.3 POOR POSTURE: ICD-10-CM

## 2019-04-05 DIAGNOSIS — M54.2 NECK PAIN: ICD-10-CM

## 2019-04-05 PROCEDURE — 97140 MANUAL THERAPY 1/> REGIONS: CPT | Mod: PO

## 2019-04-05 PROCEDURE — 97110 THERAPEUTIC EXERCISES: CPT | Mod: PO

## 2019-04-09 NOTE — PROGRESS NOTES
"  Physical Therapy Daily Treatment Note     Name: Parvez Rossi  Clinic Number: [id number]    Therapy Diagnosis:   Encounter Diagnoses   Name Primary?    Poor posture     Neck pain      Physician: Shorty Lopez MD    Visit Date: 4/10/2019    Physician Orders: PT Eval and Treat  Medical Diagnosis: neck pain  Evaluation Date: 3/18/19  Authorization Period Expiration: 3/10/20  Plan of Care Certification Period: 5/18/19  Visit #/Visits authorized: 7/20    Time In: 0900  Time Out: 1000  Total Billable Time: 31  minutes (TE-2, MT-1)    Precautions: Standard    Subjective     Pt reports:he is still feeling better and better gradually with therapy.  Notes he was a little sore last night after working out with his .     He was compliant with home exercise program.  Response to previous treatment: felt fine   Functional change: none    Pain: 0/ 10  Location: right shoulder and neck    Objective     Alphonse received the following manual therapy techniques: Joint mobilizations, Manual traction and Myofacial release were applied to the: neck and R shoulder for 8 minutes, including:  R shoulder:  - post glides grad 2-3, MFR to R pec minor  - distraction of GH joint per pt tolerance  Neck:   - gentle cervical distraction   - STM/TPR throughout neck and R side of neck/shoulder region   - scapular depression    Alphonse received therapeutic exercises to develop strength, endurance, ROM and posture for 23 minutes including:  - supine overhead 3# red stick 10"x20  - supine T pec stretching over half FR 2# 2x10"  - serratus wall slides 2x10  Ball roll up wall 10x10  - sitting thoracic stretching in bronze chair 10x10 - NP  NP- Supine thoracic extensions over half foam: 3'  NP- Seated UT stretch: 3x30"   - Scap pinches with FR vertical behind spine at wall: 2x10 w/shoulder flexion  - Wall angels : x15  - Doorway stretch: 3x30"       Written Home Exercises Provided: yes.  Exercises were reviewed and Alphonse was able to " demonstrate them prior to the end of the session.  Alphonse demonstrated good  understanding of the education provided.     See EMR under Patient Instructions for exercises provided 3/29/2019.    Assessment   Pt with fair + scapular control during dynamic activities.comfort with MT. He is continuing to improve with current POC.    Alphonse is progressing well towards his goals.   Pt prognosis is Excellent.     Pt will continue to benefit from skilled outpatient physical therapy to address the deficits listed in the problem list box on initial evaluation, provide pt/family education and to maximize pt's level of independence in the home and community environment.   Pt's spiritual, cultural and educational needs considered and pt agreeable to plan of care and goals.    Anticipated barriers to physical therapy: none    Goals:   Short Term GOALS:  1. Pt to report average pain <5/10 with regular ADLs.   2. Pt to be able to sleep >4hrs without interruption due to pain.  3. Pt to begin HEP including specific stretching and strengthening as per their diagnosis to decrease pain and improve flexibility.  4. Pt to display >60deg cervical rotation and improvement in cervical sidebending (either in range or tolerance without pain)     Long Term GOALS:   1. Pt to report <2/10, no longer limiting functional activities.  2. Pt to report return to >75% of regular ADLs, work and recreational tasks.   3. Pt to be compliant in advanced HEP to maintain progress gained in therapy thus far.   4. Pt to display >4+/5 gross MMT for B UE and trunk in order to support head and neck throughout ADLs.   5. Pt to report resolution of headaches.    Plan     Continue POC. Progress as tolerated.    Di Cueva, PT

## 2019-04-10 ENCOUNTER — CLINICAL SUPPORT (OUTPATIENT)
Dept: REHABILITATION | Facility: HOSPITAL | Age: 69
End: 2019-04-10
Payer: MEDICARE

## 2019-04-10 DIAGNOSIS — M54.2 NECK PAIN: ICD-10-CM

## 2019-04-10 DIAGNOSIS — R29.3 POOR POSTURE: ICD-10-CM

## 2019-04-10 PROCEDURE — 97110 THERAPEUTIC EXERCISES: CPT | Mod: PO

## 2019-04-10 PROCEDURE — 97140 MANUAL THERAPY 1/> REGIONS: CPT | Mod: PO

## 2019-04-12 ENCOUNTER — CLINICAL SUPPORT (OUTPATIENT)
Dept: REHABILITATION | Facility: HOSPITAL | Age: 69
End: 2019-04-12
Payer: MEDICARE

## 2019-04-12 DIAGNOSIS — R29.3 POOR POSTURE: ICD-10-CM

## 2019-04-12 DIAGNOSIS — M54.2 NECK PAIN: ICD-10-CM

## 2019-04-12 PROCEDURE — 97110 THERAPEUTIC EXERCISES: CPT | Mod: PO

## 2019-04-12 PROCEDURE — 97140 MANUAL THERAPY 1/> REGIONS: CPT | Mod: PO

## 2019-04-12 PROCEDURE — 97010 HOT OR COLD PACKS THERAPY: CPT | Mod: PO

## 2019-04-12 NOTE — PROGRESS NOTES
"  Physical Therapy Daily Treatment Note     Name: Parvez Rossi  Clinic Number: [id number]    Therapy Diagnosis:   Encounter Diagnoses   Name Primary?    Poor posture     Neck pain      Physician: Shorty Loepz MD    Visit Date: 4/12/2019    Physician Orders: PT Eval and Treat  Medical Diagnosis: neck pain  Evaluation Date: 3/18/19  Authorization Period Expiration: 3/10/20  Plan of Care Certification Period: 5/18/19  Visit #/Visits authorized: 7/20    Time In: 0900  Time Out: 1000  Total Billable Time: 60  minutes (TE-2, MT-1)    Precautions: Standard    Subjective     Pt reports:hes feeling better everyday but has noticed he has some pain in his R shoulder even when just standing and walking like at a parade or something.      He was compliant with home exercise program.  Response to previous treatment: felt fine   Functional change: none    Pain: 0/ 10  Location: right shoulder and neck    Objective     Alphonse received the following manual therapy techniques: Joint mobilizations, Manual traction and Myofacial release were applied to the: neck and R shoulder for15 minutes, including:  R shoulder:  - post glides grad 2-3, MFR to R pec minor  - distraction of GH joint per pt tolerance  Neck:   - gentle cervical distraction   - STM/TPR throughout neck and R side of neck/shoulder region   - scapular depression    Alphonse received therapeutic exercises to develop strength, endurance, ROM and posture for 25 minutes including:    Scap pinches with FR vertical behind spine at wall: 2x10 w/shoulder flexion  - Wall angels : x15  - Doorway stretch: 3x30"    - supine overhead 3# red stick 10"x20  -NP - supine T pec stretching over half FR 2# 2x10"  - serratus wall slides 3x10  - Ball roll up wall 30x10  - sitting thoracic stretching in bronze chair 10x10 - NP  NP- Supine thoracic extensions over half foam: 3'         Written Home Exercises Provided: yes.  Exercises were reviewed and Alphonse was able to demonstrate them prior " to the end of the session.  Alphonse demonstrated good  understanding of the education provided.     See EMR under Patient Instructions for exercises provided 3/29/2019.    Assessment   Pt improved scapular control and tolerance during dynamic activities today .comfort with MT. He is continuing to improve with current POC.    Alphonse is progressing well towards his goals.   Pt prognosis is Excellent.     Pt will continue to benefit from skilled outpatient physical therapy to address the deficits listed in the problem list box on initial evaluation, provide pt/family education and to maximize pt's level of independence in the home and community environment.   Pt's spiritual, cultural and educational needs considered and pt agreeable to plan of care and goals.    Anticipated barriers to physical therapy: none    Goals:   Short Term GOALS:  1. Pt to report average pain <5/10 with regular ADLs.   2. Pt to be able to sleep >4hrs without interruption due to pain.  3. Pt to begin HEP including specific stretching and strengthening as per their diagnosis to decrease pain and improve flexibility.  4. Pt to display >60deg cervical rotation and improvement in cervical sidebending (either in range or tolerance without pain)     Long Term GOALS:   1. Pt to report <2/10, no longer limiting functional activities.  2. Pt to report return to >75% of regular ADLs, work and recreational tasks.   3. Pt to be compliant in advanced HEP to maintain progress gained in therapy thus far.   4. Pt to display >4+/5 gross MMT for B UE and trunk in order to support head and neck throughout ADLs.   5. Pt to report resolution of headaches.    Plan     Continue POC. Progress as tolerated.    Di Cueva, PT

## 2019-04-15 ENCOUNTER — CLINICAL SUPPORT (OUTPATIENT)
Dept: REHABILITATION | Facility: HOSPITAL | Age: 69
End: 2019-04-15
Payer: MEDICARE

## 2019-04-15 DIAGNOSIS — M54.2 NECK PAIN: ICD-10-CM

## 2019-04-15 DIAGNOSIS — R29.3 POOR POSTURE: ICD-10-CM

## 2019-04-15 PROCEDURE — 97140 MANUAL THERAPY 1/> REGIONS: CPT | Mod: PO

## 2019-04-15 PROCEDURE — 97110 THERAPEUTIC EXERCISES: CPT | Mod: PO

## 2019-04-15 NOTE — PROGRESS NOTES
"  Physical Therapy Daily Treatment Note/PROGRESS REPORT     Name: Parvez Rossi  Clinic Number: [id number]    Therapy Diagnosis:   Encounter Diagnoses   Name Primary?    Poor posture     Neck pain      Physician: Shorty Lopez MD    Visit Date: 4/15/2019    Physician Orders: PT Eval and Treat  Medical Diagnosis: neck pain  Evaluation Date: 3/18/19  [place] completed on 4-15-19  Authorization Period Expiration: 3/10/20  Plan of Care Certification Period: 5/18/19  Visit #/Visits authorized: 8/20    Time In: 1600  Time Out: 1700  Total Billable Time: 55 minutes (TE-3, MT-1)    Precautions: Standard    Subjective     Pt reports: he had te achy pain in his R shoulder when standing at the [facility] again but he was able to implement to scap retraction as instructed at least session and that helped some.    He was compliant with home exercise program.  Response to previous treatment: felt fine   Functional change: no neck pain when driving or sleeping anymore.  Pain: 0/ 10  Pain at worst in last week: 4/10  usually doesn't have any pain these days.  Location: right shoulder and neck    Objective   Cervical ROM:  Flexion 55 slight sharp pain in CT junction  Ext 38  SBending R=33 deg; left=  30deg  L rotation= 52 deg  R rotation= 75 deg      Alphonse received the following manual therapy techniques: Joint mobilizations, Manual traction and Myofacial release were applied to the: neck and R shoulder for 15 minutes, including:  R shoulder:  - post glides grad 2-3, MFR to R pec minor  - distraction of GH joint per pt tolerance  Neck:   - gentle cervical distraction   - STM/TPR throughout neck and R side of neck/shoulder region   - scapular depression    Alphonse received therapeutic exercises to develop strength, endurance, ROM and posture for 40 minutes including:  Half FR stretching to warm up:  - supine overhead 3# red stick 10"x20  - supine T pec stretching over half FR 2# 2x10"   Supine thoracic extensions over half foam: 3'    Scap " "pinches with FR vertical behind spine at wall: 2x10 w/shoulder flexion.  - Wall angels : 2x15  OTB rows 3x10  YTB shoulder ext 3x10  - Doorway stretch: 3x30"  - serratus wall slides 3x10  - Ball roll up wall 30x10  - sitting thoracic stretching in bronze chair 10x10      Written Home Exercises Provided: yes.  Exercises were reviewed and Alphonse was able to demonstrate them prior to the end of the session.  Alphonse demonstrated good  understanding of the education provided.     See EMR under Patient Instructions for exercises provided 3/29/2019.    Assessment   Pt has improved very well with PT thus far. He still displays minor ROM restriction fo rhis neck and is complaining of R shoulder pain that would benefit from continued PT interventions. Will schdeule out trough remainder of POC, pt in agreemnt and is motivated to cont per POC.    Alphonse is progressing well towards his goals.   Pt prognosis is Excellent.     Pt will continue to benefit from skilled outpatient physical therapy to address the deficits listed in the problem list box on initial evaluation, provide pt/family education and to maximize pt's level of independence in the home and community environment.   Pt's spiritual, cultural and educational needs considered and pt agreeable to plan of care and goals.    Anticipated barriers to physical therapy: none    Goals:   Short Term GOALS:  1. Pt to report average pain <5/10 with regular ADLs. MET 4-15-19  2. Pt to be able to sleep >4hrs without interruption due to pain. MET 4-15-19  3. Pt to begin HEP including specific stretching and strengthening as per their diagnosis to decrease pain and improve flexibility. MET 4-15-19  4. Pt to display >60deg cervical rotation and improvement in cervical sidebending (either in range or tolerance without pain) PARTIALLY MET 4-15-19     Long Term GOALS:   1. Pt to report <2/10, no longer limiting functional activities.  2. Pt to report return to >75% of regular ADLs, work " and recreational tasks.   3. Pt to be compliant in advanced HEP to maintain progress gained in therapy thus far.   4. Pt to display >4+/5 gross MMT for B UE and trunk in order to support head and neck throughout ADLs.   5. Pt to report resolution of headaches.    Plan     Continue POC. Progress as tolerated.    Di Cueva, PT

## 2019-04-16 NOTE — PLAN OF CARE
"  Physical Therapy Daily Treatment Note/PROGRESS REPORT     Name: Parvez Rossi  Clinic Number: [id number]    Therapy Diagnosis:   Encounter Diagnoses   Name Primary?    Poor posture     Neck pain      Physician: Shorty Lopez MD    Visit Date: 4/15/2019    Physician Orders: PT Eval and Treat  Medical Diagnosis: neck pain  Evaluation Date: 3/18/19  DC completed on 4-15-19  Authorization Period Expiration: 3/10/20  Plan of Care Certification Period: 5/18/19  Visit #/Visits authorized: 8/20    Time In: 1600  Time Out: 1700  Total Billable Time: 55 minutes (TE-3, MT-1)    Precautions: Standard    Subjective     Pt reports: he had te achy pain in his R shoulder when standing at the [facility] again but he was able to implement to scap retraction as instructed at least session and that helped some.    He was compliant with home exercise program.  Response to previous treatment: felt fine   Functional change: no neck pain when driving or sleeping anymore.  Pain: 0/ 10  Pain at worst in last week: 4/10  usually doesn't have any pain these days.  Location: right shoulder and neck    Objective   Cervical ROM:  Flexion 55 slight sharp pain in CT junction  Ext 38  SBending R=33 deg; left=  30deg  L rotation= 52 deg  R rotation= 75 deg      Alphonse received the following manual therapy techniques: Joint mobilizations, Manual traction and Myofacial release were applied to the: neck and R shoulder for 15 minutes, including:  R shoulder:  - post glides grad 2-3, MFR to R pec minor  - distraction of GH joint per pt tolerance  Neck:   - gentle cervical distraction   - STM/TPR throughout neck and R side of neck/shoulder region   - scapular depression    Alphonse received therapeutic exercises to develop strength, endurance, ROM and posture for 40 minutes including:  Half FR stretching to warm up:  - supine overhead 3# red stick 10"x20  - supine T pec stretching over half FR 2# 2x10"   Supine thoracic extensions over half foam: 3'    Scap " "pinches with FR vertical behind spine at wall: 2x10 w/shoulder flexion.  - Wall angels : 2x15  OTB rows 3x10  YTB shoulder ext 3x10  - Doorway stretch: 3x30"  - serratus wall slides 3x10  - Ball roll up wall 30x10  - sitting thoracic stretching in bronze chair 10x10      Written Home Exercises Provided: yes.  Exercises were reviewed and Alphonse was able to demonstrate them prior to the end of the session.  Alphonse demonstrated good  understanding of the education provided.     See EMR under Patient Instructions for exercises provided 3/29/2019.    Assessment   Pt has improved very well with PT thus far. He still displays minor ROM restriction fo rhis neck and is complaining of R shoulder pain that would benefit from continued PT interventions. Will schdeule out trough remainder of POC, pt in agreemnt and is motivated to cont per POC.    Alphonse is progressing well towards his goals.   Pt prognosis is Excellent.     Pt will continue to benefit from skilled outpatient physical therapy to address the deficits listed in the problem list box on initial evaluation, provide pt/family education and to maximize pt's level of independence in the home and community environment.   Pt's spiritual, cultural and educational needs considered and pt agreeable to plan of care and goals.    Anticipated barriers to physical therapy: none    Goals:   Short Term GOALS:  1. Pt to report average pain <5/10 with regular ADLs. MET 4-15-19  2. Pt to be able to sleep >4hrs without interruption due to pain. MET 4-15-19  3. Pt to begin HEP including specific stretching and strengthening as per their diagnosis to decrease pain and improve flexibility. MET 4-15-19  4. Pt to display >60deg cervical rotation and improvement in cervical sidebending (either in range or tolerance without pain) PARTIALLY MET 4-15-19     Long Term GOALS:   1. Pt to report <2/10, no longer limiting functional activities.  2. Pt to report return to >75% of regular ADLs, work " and recreational tasks.   3. Pt to be compliant in advanced HEP to maintain progress gained in therapy thus far.   4. Pt to display >4+/5 gross MMT for B UE and trunk in order to support head and neck throughout ADLs.   5. Pt to report resolution of headaches.    Plan     Continue POC. Progress as tolerated.    Di Cueva, PT

## 2019-04-17 ENCOUNTER — CLINICAL SUPPORT (OUTPATIENT)
Dept: REHABILITATION | Facility: HOSPITAL | Age: 69
End: 2019-04-17
Payer: MEDICARE

## 2019-04-17 DIAGNOSIS — R29.3 POOR POSTURE: ICD-10-CM

## 2019-04-17 DIAGNOSIS — M54.2 NECK PAIN: ICD-10-CM

## 2019-04-17 PROCEDURE — 97110 THERAPEUTIC EXERCISES: CPT | Mod: PO

## 2019-04-17 PROCEDURE — 97140 MANUAL THERAPY 1/> REGIONS: CPT | Mod: PO

## 2019-04-17 NOTE — PROGRESS NOTES
"  Physical Therapy Daily Treatment Note/PROGRESS REPORT     Name: Parvez Rossi  Clinic Number: [id number]    Therapy Diagnosis:   Encounter Diagnoses   Name Primary?    Poor posture     Neck pain      Physician: Shorty Lopez MD    Visit Date: 4/17/2019    Physician Orders: PT Eval and Treat  Medical Diagnosis: neck pain  Evaluation Date: 3/18/19  [place] completed on 4-15-19  Authorization Period Expiration: 3/10/20  Plan of Care Certification Period: 5/18/19  Visit #/Visits authorized: 9/20    Time In: 11:03 am   Time Out: 12:00 pm  Total Billable Time: 57 minutes (TE-3, MT-1)    Precautions: Standard    Subjective     Pt reports: he is having a little pain today which overall has improved. Pt stated if he stands for a long time it will start to bother him although he does some scap retractions and tries to remain more aware of his posture which helps with his pain.       He was compliant with home exercise program.  Response to previous treatment: felt fine   Functional change: no neck pain when driving or sleeping anymore.    Pain: 1/ 10  Location: right shoulder and neck    Objective     Alphonse received the following manual therapy techniques: Joint mobilizations, Manual traction and Myofacial release were applied to the: neck and R shoulder for 13 minutes, including:    R shoulder:  - post glides grad 2-3, MFR to R pec minor  - distraction of GH joint per pt tolerance  Neck:   - gentle cervical distraction   - STM/TPR throughout neck and R side of neck/shoulder region   - scapular depression    Alphonse received therapeutic exercises to develop strength, endurance, ROM and posture for 40 minutes including:  Half FR stretching to warm up:  - supine overhead 3# red stick 10"x20  - supine T pec stretching over half FR 2# 2x10"  - supine thoracic extensions over half foam: 3'    - Prone rows 3#: 3x10  - Prone extensions 1 #: 3x10    -Scap pinches with FR vertical behind spine at wall: 2x10 w/shoulder flexion.  - Wall " "angels : 2x15  - GTB rows 3x10  - GTB shoulder ext 3x10  - Doorway stretch: 3x30"  - serratus wall slides 3x10  - Ball roll up wall 30x10  - sitting thoracic stretching in bronze chair 10x10      Written Home Exercises Provided: pt instructed to continue previous HEP  Exercises were reviewed and Alphonse was able to demonstrate them prior to the end of the session.  Alphonse demonstrated good  understanding of the education provided.     See EMR under Patient Instructions for exercises provided 3/29/2019.    Assessment     Pt was able to progress slightly today and tolerated session well. Attempted to progress with serratus wall slides by adding a pillowcase although pt experienced some mild lateral shoulder pain with this. Pt tolerated addition of prone rows and extensions well with cueing for scapular muscle recruitment.    Alphonse is progressing well towards his goals.   Pt prognosis is Excellent.     Pt will continue to benefit from skilled outpatient physical therapy to address the deficits listed in the problem list box on initial evaluation, provide pt/family education and to maximize pt's level of independence in the home and community environment.   Pt's spiritual, cultural and educational needs considered and pt agreeable to plan of care and goals.    Anticipated barriers to physical therapy: none    Goals:   Short Term GOALS:  1. Pt to report average pain <5/10 with regular ADLs. MET 4-15-19  2. Pt to be able to sleep >4hrs without interruption due to pain. MET 4-15-19  3. Pt to begin HEP including specific stretching and strengthening as per their diagnosis to decrease pain and improve flexibility. MET 4-15-19  4. Pt to display >60deg cervical rotation and improvement in cervical sidebending (either in range or tolerance without pain) PARTIALLY MET 4-15-19     Long Term GOALS:   1. Pt to report <2/10, no longer limiting functional activities.  2. Pt to report return to >75% of regular ADLs, work and " recreational tasks.   3. Pt to be compliant in advanced HEP to maintain progress gained in therapy thus far.   4. Pt to display >4+/5 gross MMT for B UE and trunk in order to support head and neck throughout ADLs.   5. Pt to report resolution of headaches.    Plan     Continue POC. Progress as tolerated.    Emeli Beltran, PTA

## 2019-04-24 ENCOUNTER — CLINICAL SUPPORT (OUTPATIENT)
Dept: REHABILITATION | Facility: HOSPITAL | Age: 69
End: 2019-04-24
Payer: MEDICARE

## 2019-04-24 DIAGNOSIS — M54.2 NECK PAIN: ICD-10-CM

## 2019-04-24 DIAGNOSIS — R29.3 POOR POSTURE: ICD-10-CM

## 2019-04-24 PROCEDURE — 97110 THERAPEUTIC EXERCISES: CPT | Mod: PO

## 2019-04-24 NOTE — PROGRESS NOTES
"  Physical Therapy Daily Treatment Note/PROGRESS REPORT     Name: Parvez Rossi  Clinic Number: [id number]    Therapy Diagnosis:   Encounter Diagnoses   Name Primary?    Poor posture     Neck pain      Physician: Shorty Lopez MD    Visit Date: 4/24/2019    Physician Orders: PT Eval and Treat  Medical Diagnosis: neck pain  Evaluation Date: 3/18/19  [place] completed on 4-15-19  Authorization Period Expiration: 3/10/20  Plan of Care Certification Period: 5/18/19  Visit #/Visits authorized: 10 /20    Time In: 11:00 am   Time Out: 11:55  pm  Total Billable Time: 55 minutes (TE-4    Precautions: Standard    Subjective     Pt reports:   Slight increase in pain     He was compliant with home exercise program.  Response to previous treatment: felt fine   Functional change: no neck pain when driving or sleeping anymore.    Pain: 2 -3 / 10  Location: right shoulder and neck    Objective     Alphonse received the following manual therapy techniques: Joint mobilizations, Manual traction and Myofacial release were applied to the: neck and R shoulder for 10 minutes, including:    R shoulder:  - post glides grad 2-3, MFR to R pec minor  - distraction of GH joint per pt tolerance  Neck:   - gentle cervical distraction   - STM/TPR throughout neck and R side of neck/shoulder region   - scapular depression    Alphonse received therapeutic exercises to develop strength, endurance, ROM and posture for 45 minutes including:  Half FR stretching to warm up:  - supine overhead 3# red stick 10"x20  - supine T pec stretching over half FR 2# 2x10"  - supine thoracic extensions over half foam: 3'    - Prone rows 3#: 3x10  - Prone extensions 1 #: 3x10    -Scap pinches with FR vertical behind spine at wall: 2x10 w/shoulder flexion.  - Wall angels : 2x15  - GTB rows 3x10  - GTB shoulder ext 3x10  - Doorway stretch: 3x30"  - serratus wall slides 3x10  - Ball roll up wall 30x10  - sitting thoracic stretching in bronze chair 10x10      Written Home " Exercises Provided: pt instructed to continue previous HEP  Exercises were reviewed and Alphonse was able to demonstrate them prior to the end of the session.  Alphonse demonstrated good  understanding of the education provided.     See EMR under Patient Instructions for exercises provided 3/29/2019.    Assessment     Patient tolerated therapy session well. Good form / technique noted with all exercises. Fatigues quickly requiring several rest breaks throughout session  Alphonse is progressing well towards his goals.   Pt prognosis is Excellent.     Pt will continue to benefit from skilled outpatient physical therapy to address the deficits listed in the problem list box on initial evaluation, provide pt/family education and to maximize pt's level of independence in the home and community environment.   Pt's spiritual, cultural and educational needs considered and pt agreeable to plan of care and goals.    Anticipated barriers to physical therapy: none    Goals:   Short Term GOALS:  1. Pt to report average pain <5/10 with regular ADLs. MET 4-15-19  2. Pt to be able to sleep >4hrs without interruption due to pain. MET 4-15-19  3. Pt to begin HEP including specific stretching and strengthening as per their diagnosis to decrease pain and improve flexibility. MET 4-15-19  4. Pt to display >60deg cervical rotation and improvement in cervical sidebending (either in range or tolerance without pain) PARTIALLY MET 4-15-19     Long Term GOALS:   1. Pt to report <2/10, no longer limiting functional activities.  2. Pt to report return to >75% of regular ADLs, work and recreational tasks.   3. Pt to be compliant in advanced HEP to maintain progress gained in therapy thus far.   4. Pt to display >4+/5 gross MMT for B UE and trunk in order to support head and neck throughout ADLs.   5. Pt to report resolution of headaches.    Plan     Continue POC. Progress as tolerated.    Tessa Payton, PTA

## 2019-05-01 ENCOUNTER — CLINICAL SUPPORT (OUTPATIENT)
Dept: REHABILITATION | Facility: HOSPITAL | Age: 69
End: 2019-05-01
Payer: MEDICARE

## 2019-05-01 DIAGNOSIS — R29.3 POOR POSTURE: ICD-10-CM

## 2019-05-01 DIAGNOSIS — M54.2 NECK PAIN: ICD-10-CM

## 2019-05-01 PROCEDURE — 97110 THERAPEUTIC EXERCISES: CPT | Mod: PO

## 2019-05-01 NOTE — PROGRESS NOTES
"  Physical Therapy Daily Treatment Note     Name: Parvez Rossi  Clinic Number: [id number]    Therapy Diagnosis:   Encounter Diagnoses   Name Primary?    Poor posture     Neck pain      Physician: Shorty Lopez MD    Visit Date: 5/1/2019    Physician Orders: PT Eval and Treat  Medical Diagnosis: neck pain  Evaluation Date: 3/18/19  [place] completed on 4-15-19  Authorization Period Expiration: 3/10/20  Plan of Care Certification Period: 5/18/19  Visit #/Visits authorized: 11/20    Time In: 10:00 am   Time Out: 11:00am  Total Billable Time: 30minutes (TE-2    Precautions: Standard    Subjective     Pt reports: his neck is feeling all the way better and he only has occasional R shoulder pain which he assumes is from his previous RC tear. Knows that his doctor said that his shoulder pain should decrease as the neck got better. Is comfortable with today being his last day of therapy.    He was compliant with home exercise program.  Response to previous treatment: felt fine   Functional change: no neck pain when driving or sleeping anymore.    Pain: 2 -3 / 10  Location: right shoulder and neck    Objective     Pt with full cervical AROM WFL without pain.    Alphonse received therapeutic exercises to develop strength, endurance, ROM and posture for 30minutes including:  Half FR stretching to warm up:  - supine overhead 3# red stick 10"x20  - supine T pec stretching over half FR 2# 2x10"  - Prone rows 6#: 3x10  - Prone extensions 2#: 3x10  -Scap pinches with FR vertical behind spine at wall: 2x10 w/shoulder flexion.  - Wall angels with liftoff : 2x10  - BTB rows 3x10  - GTB shoulder ext 3x10  NP- Doorway stretch: 3x30"  - serratus wall slides 3x10  - Ball roll up wall 30x10  NP- sitting thoracic stretching in bronze chair 10x10      Written Home Exercises Provided: pt instructed to continue previous HEP  Exercises were reviewed and Alphonse was able to demonstrate them prior to the end of the session.  Alphonse demonstrated good  " understanding of the education provided.     See EMR under Patient Instructions for exercises provided 3/29/2019.    Assessment     Patient with good progress in therapy thus far. He is compliant and has advanced with his HEP. Would like to trial a few weeks indep with his HEP to manage and prevent neck and R shoulder pain. Will follow up as needed. Pt in agreement.   Alphonse is progressing well towards his goals.   Pt prognosis is Excellent.     Pt will continue to benefit from skilled outpatient physical therapy to address the deficits listed in the problem list box on initial evaluation, provide pt/family education and to maximize pt's level of independence in the home and community environment.   Pt's spiritual, cultural and educational needs considered and pt agreeable to plan of care and goals.    Anticipated barriers to physical therapy: none    Goals:   Short Term GOALS:  1. Pt to report average pain <5/10 with regular ADLs. MET 4-15-19  2. Pt to be able to sleep >4hrs without interruption due to pain. MET 4-15-19  3. Pt to begin HEP including specific stretching and strengthening as per their diagnosis to decrease pain and improve flexibility. MET 4-15-19  4. Pt to display >60deg cervical rotation and improvement in cervical sidebending (either in range or tolerance without pain) PARTIALLY MET 4-15-19     Long Term GOALS:   1. Pt to report <2/10, no longer limiting functional activities. MET 5-1-19  2. Pt to report return to >75% of regular ADLs, work and recreational tasks. MET 5-1-19  3. Pt to be compliant in advanced HEP to maintain progress gained in therapy thus far. MET 5-1-19  4. Pt to display >4+/5 gross MMT for B UE and trunk in order to support head and neck throughout ADLs. MET 5-1-19  5. Pt to report resolution of headaches.MET 5-1-19    Plan     Continue POC. Progress as tolerated.    Di Cueva, PT

## 2019-08-29 DIAGNOSIS — M79.671 RIGHT FOOT PAIN: Primary | ICD-10-CM

## 2019-09-26 ENCOUNTER — OFFICE VISIT (OUTPATIENT)
Dept: UROLOGY | Facility: CLINIC | Age: 69
End: 2019-09-26
Payer: MEDICARE

## 2019-09-26 VITALS — WEIGHT: 195.13 LBS | HEIGHT: 71 IN | RESPIRATION RATE: 16 BRPM | BODY MASS INDEX: 27.32 KG/M2

## 2019-09-26 DIAGNOSIS — N13.8 BPH WITH URINARY OBSTRUCTION: ICD-10-CM

## 2019-09-26 DIAGNOSIS — N40.1 BPH WITH URINARY OBSTRUCTION: ICD-10-CM

## 2019-09-26 DIAGNOSIS — N50.82 SCROTAL PAIN: Primary | ICD-10-CM

## 2019-09-26 PROCEDURE — 99999 PR PBB SHADOW E&M-EST. PATIENT-LVL III: CPT | Mod: PBBFAC,,, | Performed by: UROLOGY

## 2019-09-26 PROCEDURE — 99204 OFFICE O/P NEW MOD 45 MIN: CPT | Mod: S$GLB,,, | Performed by: UROLOGY

## 2019-09-26 PROCEDURE — 1101F PR PT FALLS ASSESS DOC 0-1 FALLS W/OUT INJ PAST YR: ICD-10-PCS | Mod: CPTII,S$GLB,, | Performed by: UROLOGY

## 2019-09-26 PROCEDURE — 99204 PR OFFICE/OUTPT VISIT, NEW, LEVL IV, 45-59 MIN: ICD-10-PCS | Mod: S$GLB,,, | Performed by: UROLOGY

## 2019-09-26 PROCEDURE — 99999 PR PBB SHADOW E&M-EST. PATIENT-LVL III: ICD-10-PCS | Mod: PBBFAC,,, | Performed by: UROLOGY

## 2019-09-26 PROCEDURE — 1101F PT FALLS ASSESS-DOCD LE1/YR: CPT | Mod: CPTII,S$GLB,, | Performed by: UROLOGY

## 2019-09-26 NOTE — PROGRESS NOTES
Subjective:       Patient ID: Parvez Rossi is a 69 y.o. male.    Chief Complaint: testaglia (r testicle x 1 week)    HPI   Parvez Rossi is a 69 y.o. male with a PMHx of DM and HTN who presents to the clinic for evaluation of right testicular pain. AUA Sx score is an 8. His onset of pain is a week ago, describing the pain as dull during the day and worsening at night when he lays down. He denies burning, trauma, or other urinary symptoms. He denies a FHx of prostate CA.      Past Medical History:   Diagnosis Date    Diabetes mellitus     Diabetes mellitus, type 2     Hyperlipidemia     Hypertension        Past Surgical History:   Procedure Laterality Date    HERNIA REPAIR      SMALL INTESTINE SURGERY      TONSILLECTOMY         History reviewed. No pertinent family history.    Social History     Socioeconomic History    Marital status: Single     Spouse name: Not on file    Number of children: Not on file    Years of education: Not on file    Highest education level: Not on file   Occupational History    Not on file   Social Needs    Financial resource strain: Not on file    Food insecurity:     Worry: Not on file     Inability: Not on file    Transportation needs:     Medical: Not on file     Non-medical: Not on file   Tobacco Use    Smoking status: Never Smoker    Smokeless tobacco: Never Used   Substance and Sexual Activity    Alcohol use: No    Drug use: No    Sexual activity: Not on file   Lifestyle    Physical activity:     Days per week: Not on file     Minutes per session: Not on file    Stress: Not on file   Relationships    Social connections:     Talks on phone: Not on file     Gets together: Not on file     Attends [employer] service: Not on file     Active member of club or organization: Not on file     Attends meetings of clubs or organizations: Not on file     Relationship status: Not on file   Other Topics Concern    Not on file   Social History Narrative    Not on file        Allergies:  Patient has no known allergies.    Medications:    Current Outpatient Medications:     empagliflozin (JARDIANCE ORAL), Take by mouth., Disp: , Rfl:     fenofibrate (TRIGLIDE) 50 MG tablet, Take 160 mg by mouth once daily. , Disp: , Rfl:     fish oil-omega-3 fatty acids 300-1,000 mg capsule, Take 2 g by mouth once daily., Disp: , Rfl:     ibuprofen (ADVIL,MOTRIN) 200 MG tablet, Take 200 mg by mouth every 6 (six) hours as needed for Pain., Disp: , Rfl:     insulin aspart (NOVOLOG) 100 unit/mL injection, Inject 70 Units into the skin 3 (three) times daily before meals., Disp: , Rfl:     insulin glargine (LANTUS) 100 unit/mL injection, Inject 50 Units into the skin every evening., Disp: , Rfl:     irbesartan (AVAPRO) 75 MG tablet, Take 300 mg by mouth every evening. , Disp: , Rfl:     metformin (GLUCOPHAGE) 500 MG tablet, Take 1,000 mg by mouth 4 (four) times daily. , Disp: , Rfl:     ondansetron (ZOFRAN-ODT) 4 MG TbDL, Take 2 tablets (8 mg total) by mouth every 8 (eight) hours as needed. (Patient not taking: Reported on 9/26/2019), Disp: 10 tablet, Rfl: 1    oxycodone-acetaminophen (PERCOCET)  mg per tablet, Take 1 tablet by mouth every 4 to 6 hours as needed. (Patient not taking: Reported on 9/26/2019), Disp: 50 tablet, Rfl: 0    Review of Systems   Constitutional: Negative for activity change, appetite change, chills, diaphoresis, fatigue, fever and unexpected weight change.   HENT: Negative for congestion, dental problem, hearing loss, mouth sores, postnasal drip, rhinorrhea, sinus pressure and trouble swallowing.    Eyes: Negative for pain, discharge and itching.   Respiratory: Negative for apnea, cough, choking, chest tightness, shortness of breath and wheezing.    Cardiovascular: Negative for chest pain, palpitations and leg swelling.   Gastrointestinal: Negative for abdominal distention, abdominal pain, anal bleeding, blood in stool, constipation, diarrhea, nausea, rectal pain  and vomiting.   Endocrine: Negative for polydipsia and polyuria.   Genitourinary: Positive for testicular pain (right). Negative for decreased urine volume, difficulty urinating, discharge, dysuria, enuresis, flank pain, frequency, genital sores, hematuria, penile pain, penile swelling, scrotal swelling and urgency.   Musculoskeletal: Negative for arthralgias and back pain.   Skin: Negative for color change, rash and wound.   Neurological: Negative for dizziness, syncope, speech difficulty, light-headedness and headaches.   Hematological: Negative for adenopathy. Does not bruise/bleed easily.   Psychiatric/Behavioral: Negative for behavioral problems and confusion.       Objective:      Physical Exam   Constitutional: He appears well-developed.   HENT:   Head: Normocephalic.   Neck: Neck supple.   Cardiovascular: Normal rate.    Pulmonary/Chest: Effort normal.   Abdominal: Soft.   Genitourinary:   Genitourinary Comments: Prostate was smooth without nodularity. No rectal masses.  25 grams, benign. Normal perineum.    No testicular masses palpated   Neurological: He is alert.   Skin: Skin is warm.     Psychiatric: He has a normal mood and affect.     no hernias  Assessment:       1. Scrotal pain    2. BPH with urinary obstruction        Plan:       Parvez was seen today for testaglia.    Diagnoses and all orders for this visit:    Scrotal pain  -     US Scrotum And Testicles; Future    BPH with urinary obstruction  -     Prostate Specific Antigen, Diagnostic; Future          Order PSA today.  Recommended taking an anti-inflammatory.  Schedule US Scrotum and Testicle    ICris, am acting as a scribe on this patient encounter in the presence and under the supervision of Dr. Ortiz.    09/26/2019 7:40 AM    I, Dr. Ortiz, personally performed the services described in this documentation.   All medical record entries made by the scribe were at my direction and in my presence.   I have reviewed the chart and  agree that the record is accurate and complete.   Rodrigo Ortiz MD.  7:40 AM 09/26/2019

## 2019-09-27 ENCOUNTER — HOSPITAL ENCOUNTER (OUTPATIENT)
Dept: RADIOLOGY | Facility: HOSPITAL | Age: 69
Discharge: HOME | End: 2019-09-27
Attending: UROLOGY
Payer: MEDICARE

## 2019-09-27 DIAGNOSIS — N50.82 SCROTAL PAIN: ICD-10-CM

## 2019-09-27 PROCEDURE — 76870 US EXAM SCROTUM: CPT | Mod: TC

## 2019-09-27 PROCEDURE — 76870 US EXAM SCROTUM: CPT | Mod: 26,,, | Performed by: RADIOLOGY

## 2019-09-27 PROCEDURE — 76870 US SCROTUM AND TESTICLES: ICD-10-PCS | Mod: 26,,, | Performed by: RADIOLOGY

## 2019-09-30 ENCOUNTER — TELEPHONE (OUTPATIENT)
Dept: UROLOGY | Facility: CLINIC | Age: 69
End: 2019-09-30

## 2019-09-30 NOTE — TELEPHONE ENCOUNTER
----- Message from Prudencio Yap sent at 9/30/2019  2:28 PM CDT -----  Pt calling to find out if his ultrasound results are in.    [phone]

## 2019-10-02 ENCOUNTER — TELEPHONE (OUTPATIENT)
Dept: UROLOGY | Facility: CLINIC | Age: 69
End: 2019-10-02

## 2019-10-02 DIAGNOSIS — N50.89 SCROTAL MASS: Primary | ICD-10-CM

## 2019-10-02 NOTE — TELEPHONE ENCOUNTER
----- Message from Rodrigo Ortiz Jr., MD sent at 10/2/2019 10:11 AM CDT -----  fred varicocoele and epi cyst  Needs ct stone study

## 2019-10-07 ENCOUNTER — HOSPITAL ENCOUNTER (OUTPATIENT)
Dept: RADIOLOGY | Facility: HOSPITAL | Age: 69
Discharge: HOME | End: 2019-10-07
Attending: UROLOGY
Payer: MEDICARE

## 2019-10-07 DIAGNOSIS — N50.89 SCROTAL MASS: ICD-10-CM

## 2019-10-07 PROCEDURE — 74176 CT RENAL STONE STUDY ABD PELVIS WO: ICD-10-PCS | Mod: 26,,, | Performed by: RADIOLOGY

## 2019-10-07 PROCEDURE — 74176 CT ABD & PELVIS W/O CONTRAST: CPT | Mod: 26,,, | Performed by: RADIOLOGY

## 2019-10-07 PROCEDURE — 74176 CT ABD & PELVIS W/O CONTRAST: CPT | Mod: TC

## 2019-10-10 ENCOUNTER — TELEPHONE (OUTPATIENT)
Dept: UROLOGY | Facility: CLINIC | Age: 69
End: 2019-10-10

## 2019-10-10 NOTE — TELEPHONE ENCOUNTER
----- Message from Anita Dunne MA sent at 10/10/2019  8:23 AM CDT -----  Contact: [phone]  Type:  Test Results    Who Called: Pt    Name of Test (Lab/Mammo/Etc): Ct Scan     Date of Test: 10/7/19    Ordering Provider: Dr Ortiz     [facility]     Would the patient rather a call back or a response via [facility]? Call Back     Best Call Back Number:  [phone]

## 2019-10-10 NOTE — TELEPHONE ENCOUNTER
P/ dr nicholas    ?liver mass vs artifact  Tony small renal stones  Needs to see pcp for f/u liver ct     Pt notified and agreeable. He will contact pcp

## 2020-01-02 LAB
EXT 24 HR UR METANEPHRINE: ABNORMAL
EXT 24 HR UR NORMETANEPHRINE: ABNORMAL
EXT 24 HR UR NORMETANEPHRINE: ABNORMAL
EXT 25 HYDROXY VIT D2: ABNORMAL
EXT 25 HYDROXY VIT D3: ABNORMAL
EXT 5 HIAA 24 HR URINE: ABNORMAL
EXT 5 HIAA BLOOD: ABNORMAL
EXT ACTH: ABNORMAL
EXT AFP: ABNORMAL
EXT ALBUMIN: 4.3 (ref 3.5–5)
EXT ALKALINE PHOSPHATASE: 49 (ref 38–126)
EXT ALT: 30 (ref 7–56)
EXT AMYLASE: ABNORMAL
EXT ANTI ISLET CELL AB: ABNORMAL
EXT ANTI PARIETAL CELL AB: ABNORMAL
EXT ANTI THYROID AB: ABNORMAL
EXT AST: 19 (ref 7–40)
EXT BILIRUBIN DIRECT: ABNORMAL
EXT BILIRUBIN TOTAL: 0.5 (ref 0–1.2)
EXT BK VIRUS DNA QN PCR: ABNORMAL
EXT BUN: 20 (ref 7–21)
EXT C PEPTIDE: ABNORMAL
EXT CA 125: ABNORMAL
EXT CA 19-9: ABNORMAL
EXT CA 27-29: ABNORMAL
EXT CALCITONIN: ABNORMAL
EXT CALCIUM: 9.7 (ref 8.5–10.3)
EXT CEA: ABNORMAL
EXT CHLORIDE: 98 (ref 98–107)
EXT CHOLESTEROL: 119 (ref 100–200)
EXT CHROMOGRANIN A: ABNORMAL
EXT CO2: 26 (ref 21–31)
EXT CREATININE UA: ABNORMAL
EXT CREATININE: 1 MG/DL (ref 0.7–1.2)
EXT CYCLOSPORONE LEVEL: ABNORMAL
EXT DOPAMINE: ABNORMAL
EXT EBV DNA BY PCR: ABNORMAL
EXT EPINEPHRINE: ABNORMAL
EXT FOLATE: ABNORMAL
EXT FREE T3: ABNORMAL
EXT FREE T4: ABNORMAL
EXT FSH: ABNORMAL
EXT GASTRIN RELEASING PEPTIDE: ABNORMAL
EXT GASTRIN RELEASING PEPTIDE: ABNORMAL
EXT GASTRIN: ABNORMAL
EXT GGT: ABNORMAL
EXT GHRELIN: ABNORMAL
EXT GLUCAGON: ABNORMAL
EXT GLUCOSE: 259 (ref 70–100)
EXT GROWTH HORMONE: ABNORMAL
EXT HCV RNA QUANT PCR: ABNORMAL
EXT HDL: 30 (ref 40–75)
EXT HEMATOCRIT: 42.1 (ref 40–52)
EXT HEMOGLOBIN A1C: ABNORMAL
EXT HEMOGLOBIN: 14.4 (ref 13.6–17.5)
EXT HISTAMINE 24 HR URINE: ABNORMAL
EXT HISTAMINE: ABNORMAL
EXT IGF-1: ABNORMAL
EXT IMMUNKNOW (NON-STIMULATED): ABNORMAL
EXT IMMUNKNOW (STIMULATED): ABNORMAL
EXT INR: 1 (ref 0.8–1.2)
EXT INSULIN: ABNORMAL
EXT LANREOTIDE LEVEL: ABNORMAL
EXT LDH, TOTAL: ABNORMAL
EXT LDL CHOLESTEROL: 55 (ref 0–130)
EXT LIPASE: ABNORMAL
EXT MAGNESIUM: ABNORMAL
EXT METANEPHRINE FREE PLASMA: ABNORMAL
EXT MOTILIN: ABNORMAL
EXT NEUROKININ A CAMB: ABNORMAL
EXT NEUROKININ A ISI: ABNORMAL
EXT NEUROTENSIN: ABNORMAL
EXT NOREPINEPHRINE: ABNORMAL
EXT NORMETANEPHRINE: ABNORMAL
EXT NSE: ABNORMAL
EXT OCTREOTIDE LEVEL: ABNORMAL
EXT PANCREASTATIN CAMB: ABNORMAL
EXT PANCREASTATIN ISI: ABNORMAL
EXT PANCREATIC POLYPEPTIDE: ABNORMAL
EXT PHOSPHORUS: ABNORMAL
EXT PLATELETS: 234 (ref 150–350)
EXT POTASSIUM: 5 (ref 3.5–5)
EXT PROGRAF LEVEL: ABNORMAL
EXT PROLACTIN: ABNORMAL
EXT PROTEIN TOTAL: 8 (ref 6.3–8.2)
EXT PROTEIN UA: ABNORMAL
EXT PT: 13.4 (ref 12.3–14.7)
EXT PTH, INTACT: ABNORMAL
EXT PTT: 29.2 (ref 22.7–33.4)
EXT RAPAMUNE LEVEL: ABNORMAL
EXT SEROTONIN: ABNORMAL
EXT SODIUM: 137 MMOL/L (ref 135–145)
EXT SOMATOSTATIN: ABNORMAL
EXT SUBSTANCE P: ABNORMAL
EXT TRIGLYCERIDES: 211 (ref 30–150)
EXT TRYPTASE: ABNORMAL
EXT TSH: ABNORMAL
EXT URIC ACID: ABNORMAL
EXT URINE AMYLASE U/HR: ABNORMAL
EXT URINE AMYLASE U/L: ABNORMAL
EXT VASOACTIVE INTESTINAL POLYPEPTIDE: ABNORMAL
EXT VITAMIN B12: ABNORMAL
EXT VMA 24 HR URINE: ABNORMAL
EXT WBC: 4.3 (ref 4.5–11)
NEURON SPECIFIC ENOLASE: ABNORMAL

## 2020-01-20 ENCOUNTER — TELEPHONE (OUTPATIENT)
Dept: NEUROLOGY | Facility: HOSPITAL | Age: 70
End: 2020-01-20

## 2020-01-21 ENCOUNTER — TELEPHONE (OUTPATIENT)
Dept: NEUROLOGY | Facility: HOSPITAL | Age: 70
End: 2020-01-21

## 2020-01-29 NOTE — PROGRESS NOTES
"[facility]AN[facility]:  [facility]  A collaboration between [facility] and [facility]      PATIENT: Parvez Rossi  MRN: [id number]  DATE: 1/30/2020    Subjective:      Chief Complaint: Liver Mass    He initially presented to the urologist with a persistent right scrotal pain. A CT scan noncontrast was obtained which showed a possible lesion in the liver which led to MRI of the abdomen showing of the lesion.  Biopsy of the lesion was consistent with adenocarcinoma.  He also underwent endoscopic ultrasound of the pancreas and a cystic lesion was biopsied which was negative for cancer.  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 Next for he complains of tiredness however does not have any significant problem.  He has lost over 5 lb with the colonoscopy prep.  He is able to manage the blood sugar better with this insulin dosages.  He recently went on a trip with his friend to [facility] for 10 days      Vitals: Blood pressure 130/76, pulse 79, temperature 97.8 °F (36.6 °C), temperature source Oral, height 5' 11" (1.803 m), weight 85.4 kg (188 lb 4.4 oz). -    ECOG Score: 0 - Asymptomatic    Diagnosis: No diagnosis found.     Interval History:       Oncologic History:   Oncologic History    Oncologic Treatment    Pathology      Past Medical History:  Past Medical History:   Diagnosis Date    Diabetes mellitus     Diabetes mellitus, type 2     Hyperlipidemia     Hypertension        Past Surgical History:  Past Surgical History:   Procedure Laterality Date    HERNIA REPAIR      SMALL INTESTINE SURGERY      TONSILLECTOMY         Family History:  History reviewed. No pertinent family history.    Allergies:  Patient has no known allergies.    Medications:   Current Outpatient Medications   Medication Sig    aspirin (ECOTRIN) 81 MG EC tablet Take 81 mg by " mouth.    empagliflozin (JARDIANCE ORAL) Take by mouth.    fenofibrate (TRIGLIDE) 50 MG tablet Take 160 mg by mouth once daily.     fenofibrate 160 MG Tab     fish oil-omega-3 fatty acids 300-1,000 mg capsule Take 2 g by mouth once daily.    ibuprofen (ADVIL,MOTRIN) 200 MG tablet Take 200 mg by mouth every 6 (six) hours as needed for Pain.    insulin asp prt-insulin aspart, NOVOLOG 70/30, (NOVOLOG 70/30) 100 unit/mL (70-30) Soln     insulin aspart (NOVOLOG) 100 unit/mL injection Inject 70 Units into the skin 3 (three) times daily before meals.    insulin glargine (LANTUS) 100 unit/mL injection Inject 50 Units into the skin every evening.    irbesartan (AVAPRO) 75 MG tablet Take 300 mg by mouth every evening.     LEVEMIR U-100 INSULIN 100 unit/mL injection     metformin (GLUCOPHAGE) 500 MG tablet Take 1,000 mg by mouth 4 (four) times daily.     omega 3-dha-epa-fish oil 1,000 mg (120 mg-180 mg) Cap Take 2 capsules by mouth.    ondansetron (ZOFRAN-ODT) 4 MG TbDL Take 2 tablets (8 mg total) by mouth every 8 (eight) hours as needed.    oxycodone-acetaminophen (PERCOCET)  mg per tablet Take 1 tablet by mouth every 4 to 6 hours as needed.     No current facility-administered medications for this visit.         Review of Systems   Constitutional: Positive for fatigue. Negative for activity change, appetite change, chills, diaphoresis, fever and unexpected weight change.   HENT: Negative for dental problem, ear discharge, ear pain, facial swelling, hearing loss, mouth sores, postnasal drip, sinus pressure, sneezing, sore throat, tinnitus and trouble swallowing.    Eyes: Negative for photophobia, discharge, redness, itching and visual disturbance.   Respiratory: Positive for cough. Negative for apnea, choking, chest tightness, shortness of breath, wheezing and stridor.    Cardiovascular: Negative for chest pain, palpitations and leg swelling.   Gastrointestinal: Negative for abdominal distention, abdominal  pain, anal bleeding, blood in stool, constipation, diarrhea, nausea, rectal pain and vomiting.   Endocrine: Negative.    Genitourinary: Negative.    Musculoskeletal: Negative for arthralgias, back pain, gait problem, joint swelling, myalgias and neck stiffness.   Skin: Negative for pallor and rash.   Allergic/Immunologic: Negative.    Neurological: Negative for tremors, seizures, syncope, facial asymmetry, weakness, light-headedness, numbness and headaches.   Hematological: Negative for adenopathy.   Psychiatric/Behavioral: Negative for behavioral problems, confusion and hallucinations.      Objective:      Physical Exam   Constitutional: He is oriented to person, place, and time. He appears well-developed and well-nourished. No distress.   HENT:   Head: Normocephalic and atraumatic.   Right Ear: External ear normal.   Left Ear: External ear normal.   Eyes: Pupils are equal, round, and reactive to light. Conjunctivae and EOM are normal.   Neck: Normal range of motion. Neck supple.   Cardiovascular: Normal rate and regular rhythm.   Pulmonary/Chest: Effort normal and breath sounds normal.   Abdominal: Soft. Bowel sounds are normal.   Musculoskeletal: Normal range of motion.   Neurological: He is alert and oriented to person, place, and time.   Skin: Skin is warm and dry. He is not diaphoretic.   Psychiatric: He has a normal mood and affect. His behavior is normal.      Assessment:       No diagnosis found.    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  He needs a right hepatic lobectomy.    Had a long discussion about the surgery the recovery time.  I informed him it is a major surgery he may end up losing about 15-20 lb weight after the surgery I talked about the risk of surgery such as bleeding infection DVT PE MI stroke bile leak following surgery requiring ERCP percutaneous drainages.    I informed him most likely this may be a  primary liver tumor arising from the peripheral bile duct however since it is a possibility of this being metastatic.  The final resection make provide more answers    I ordered for some basic hepatitis BC panel I will see him again next week.  I emphasized the importance of nutrition physical activities    He is seen the cardiologist on Monday.  Will repeat the CT of the chest to rule out any lung primary      Plan:       Long discussion about the surgery the recovery risks and benefits  Will see him next week  He will require right hepatic lobectomy    Talked about the surgery recovery time what to expect after the surgery.          SMITH Tran MD, FACS   Associate Professor of Surgery, [facility]   Neuroendocrine Surgery, Hepatic/Pancreatic & General Surgery   [address], [address]   [place] [address]   ph. [phone]; [phone]   fax. [phone]

## 2020-01-30 ENCOUNTER — OFFICE VISIT (OUTPATIENT)
Dept: NEUROLOGY | Facility: HOSPITAL | Age: 70
End: 2020-01-30
Attending: SURGERY
Payer: MEDICARE

## 2020-01-30 VITALS
TEMPERATURE: 98 F | HEIGHT: 71 IN | WEIGHT: 188.25 LBS | DIASTOLIC BLOOD PRESSURE: 76 MMHG | SYSTOLIC BLOOD PRESSURE: 130 MMHG | HEART RATE: 79 BPM | BODY MASS INDEX: 26.35 KG/M2

## 2020-01-30 DIAGNOSIS — C22.1 CHOLANGIOCARCINOMA: Primary | ICD-10-CM

## 2020-01-30 PROCEDURE — 99213 OFFICE O/P EST LOW 20 MIN: CPT | Performed by: SURGERY

## 2020-01-30 RX ORDER — FENOFIBRATE 160 MG/1
TABLET ORAL
COMMUNITY
Start: 2019-12-24

## 2020-01-30 RX ORDER — GLUCOSAM/CHONDRO/HERB 149/HYAL 750-100 MG
2 TABLET ORAL
COMMUNITY

## 2020-01-30 RX ORDER — INSULIN ASPART 100 [IU]/ML
INJECTION, SUSPENSION SUBCUTANEOUS
COMMUNITY
Start: 2015-03-03

## 2020-01-30 RX ORDER — INSULIN DETEMIR 100 [IU]/ML
INJECTION, SOLUTION SUBCUTANEOUS
COMMUNITY
Start: 2019-12-24

## 2020-01-30 RX ORDER — ASPIRIN 81 MG/1
81 TABLET ORAL
COMMUNITY

## 2020-01-30 NOTE — PATIENT INSTRUCTIONS
Your Lab work is today at outpatient diagnostic 1st floor      Your next appointment with Dr. Pro 2/6/20 at 1100 am    You have orders to get CT of chest and Abdomen

## 2020-02-03 ENCOUNTER — TELEPHONE (OUTPATIENT)
Dept: NEUROLOGY | Facility: HOSPITAL | Age: 70
End: 2020-02-03

## 2020-02-03 NOTE — TELEPHONE ENCOUNTER
----- Message from Ramona Stephens sent at 2/3/2020  7:48 AM CST -----  Contact: [place]   Because our doctor ordered it.  ----- Message -----  From: Tori Urias LPN  Sent: 1/31/2020   3:17 PM CST  To: Ramona Stephens    Why can't [facility] get there own authorization for CT scans?  ----- Message -----  From: Keeley Mcclain  Sent: 1/31/2020   3:03 PM CST  To: [facility] Staff    Franklin Nguyen is call to get an authorization for the pt CT SCAN    Franklin Nguyen # [phone] ([place])

## 2020-02-05 NOTE — PROGRESS NOTES
"NOLANETS:  [facility]  A collaboration between [facility] and [facility]      PATIENT: Parvez Rossi  MRN: [id number]  DATE: 2/6/2020    Subjective:      Chief Complaint:   Pre op f/u    CC is doing well since the last visit.  He underwent cardiology consultation and has been recommended to undergo surgery. He also underwent CT of the chest and abdomen which does not show any new lesion or information.    He is scheduled for a PET scan next Tuesday.    Vitals: Blood pressure 120/72, pulse 83, temperature 98.4 °F (36.9 °C), temperature source Oral, height 5' 11" (1.803 m), weight 85.2 kg (187 lb 13.3 oz). -    ECOG Score: 0 - Asymptomatic    Diagnosis: No diagnosis found.     Interval History: He initially presented to the urologist with a persistent right scrotal pain. A CT scan noncontrast was obtained which showed a possible lesion in the liver which led to MRI of the abdomen showing of the lesion.  Biopsy of the lesion was consistent with adenocarcinoma.  He also underwent endoscopic ultrasound of the pancreas and a cystic lesion was biopsied which was negative for cancer.  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 Next for he complains of tiredness however does not have any significant problem.  He has lost over 5 lb with the colonoscopy prep.  He is able to manage the blood sugar better with this insulin dosages.  He recently went on a trip with his friend to [facility] for 10 days.  He needs a right hepatic lobectomy.   Discussion about the surgery the recovery time  I informed him most likely this may be a primary liver tumor arising from the peripheral bile duct however since it is a possibility of this being metastatic.  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 No pertinent family history.    Allergies:  Patient has no known allergies.    Medications:   Current Outpatient Medications   Medication Sig    aspirin (ECOTRIN) 81 MG EC tablet Take 81 mg by mouth.    empagliflozin (JARDIANCE ORAL) Take by mouth.    evolocumab (REPATHA SURECLICK) 140 mg/mL PnIj     fenofibrate 160 MG Tab     fish oil-omega-3 fatty acids 300-1,000 mg capsule Take 2 g by mouth once daily.    ibuprofen (ADVIL,MOTRIN) 200 MG tablet Take 200 mg by mouth every 6 (six) hours as needed for Pain.    insulin aspart (NOVOLOG) 100 unit/mL injection Inject 70 Units into the skin 3 (three) times daily before meals.    irbesartan (AVAPRO) 75 MG tablet Take 300 mg by mouth every evening.     LEVEMIR U-100 INSULIN 100 unit/mL injection     metformin (GLUCOPHAGE) 500 MG tablet Take 1,000 mg by mouth 4 (four) times daily.     fenofibrate (TRIGLIDE) 50 MG tablet Take 160 mg by mouth once daily.     insulin asp prt-insulin aspart, NOVOLOG 70/30, (NOVOLOG 70/30) 100 unit/mL (70-30) Soln     insulin glargine (LANTUS) 100 unit/mL injection Inject 50 Units into the skin every evening.    omega 3-dha-epa-fish oil 1,000 mg (120 mg-180 mg) Cap Take 2 capsules by mouth.    ondansetron (ZOFRAN-ODT) 4 MG TbDL Take 2 tablets (8 mg total) by mouth every 8 (eight) hours as needed.    oxycodone-acetaminophen (PERCOCET)  mg per tablet Take 1 tablet by mouth every 4 to 6 hours as needed.     No current facility-administered medications for this visit.         Review of Systems   Constitutional: Negative for activity  change, appetite change, chills, diaphoresis, fatigue, fever and unexpected weight change.   HENT: Negative for congestion, drooling, ear pain, facial swelling, hearing loss, mouth sores, sinus pressure, sinus pain, sore throat, tinnitus and trouble swallowing.    Eyes: Negative for pain, discharge, redness, itching and visual disturbance.   Respiratory: Negative for apnea, cough, choking, chest tightness, shortness of breath, wheezing and stridor.    Cardiovascular: Negative for chest pain, palpitations and leg swelling.   Gastrointestinal: Negative for abdominal distention, abdominal pain, anal bleeding, blood in stool, constipation and rectal pain.   Endocrine: Negative.    Genitourinary: Negative.    Musculoskeletal: Negative for back pain, gait problem, joint swelling, myalgias, neck pain and neck stiffness.   Skin: Negative for color change and rash.   Allergic/Immunologic: Negative.    Neurological: Negative for seizures, syncope, facial asymmetry, light-headedness, numbness and headaches.   Hematological: Negative for adenopathy. Does not bruise/bleed easily.   Psychiatric/Behavioral: Negative for agitation, behavioral problems, confusion and decreased concentration.      Objective:      Physical Exam   Constitutional: He is oriented to person, place, and time. He appears well-developed and well-nourished.   HENT:   Head: Normocephalic and atraumatic.   Right Ear: External ear normal.   Left Ear: External ear normal.   Eyes: Pupils are equal, round, and reactive to light. Conjunctivae and EOM are normal.   Neck: Normal range of motion.   Cardiovascular: Normal rate and regular rhythm.   Pulmonary/Chest: Effort normal and breath sounds normal.   Abdominal: Soft. Bowel sounds are normal. There is no tenderness. There is no rebound and no guarding. No hernia.   Musculoskeletal: Normal range of motion.   Neurological: He is alert and oriented to person, place, and time.   Skin: Skin is warm and dry. He is not  diaphoretic.   Psychiatric: He has a normal mood and affect. His behavior is normal.      Assessment:       No diagnosis found.    Laboratory Data: 1/30/2020  CA 19-9 2.0 - 40.0 U/mL 57.0High       AFP 0.0 - 8.4 ng/mL 1.2      Hepatitis C Ab Negative Negative      Hep B S Ab Negative                Scans:  CT scan of the chest and abdomen from the [facility] was reviewed.       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  He is otherwise asymptomatic.  He initially presented with right testicular pain which started the workup with CT scan the CT scan showed a liver mass which was further studied and finally the biopsy shows adenocarcinoma.  He has had a workup including EGD colonoscopy endoscopic ultrasound and finally the conclusion is he may have a peripheral cholangiocarcinoma.    I had a long discussion with him about the surgery he requires a right hepatic lobectomy I talked about the options of robotic versus open versus laparoscopic I talked about the risk of surgery such as bleeding infection DVT PE MI stroke remnant liver liver dysfunction a bile duct injuries leaks causing a prolonged drainage ERCPs.  Neurology a.    He also has a fatty liver and therefore talked about the prolonged recovery time.  I clearly explained him about the volume of the right low and the need for right hepatic lobectomy as this mass about 5 cm involving the right hepatic vein and therefore he would require a right hepatic lobectomy and not a Section ectomy.    The plan would be to proceed with minimally invasive and if there is any possibility he would have a regular conventional hepatic lobectomy.    He is scheduled for a PET scan on Tuesday    Plan:       NPO after Thursday midnight  For right hepatic lobectomy on Friday  Risks and benefits clearly explained and consent obtained     He had all the opportunity to ask questions and all his questions were  answered in a satisfactory fashion          SMITH Tran MD, FACS   Associate Professor of Surgery, [facility]   Neuroendocrine Surgery, Hepatic/Pancreatic & General Surgery   [address], [address]   [place] [address]   ph. [phone]; [phone]   fax. [phone]

## 2020-02-06 ENCOUNTER — OFFICE VISIT (OUTPATIENT)
Dept: NEUROLOGY | Facility: HOSPITAL | Age: 70
End: 2020-02-06
Attending: SURGERY
Payer: MEDICARE

## 2020-02-06 VITALS
BODY MASS INDEX: 26.29 KG/M2 | HEART RATE: 83 BPM | DIASTOLIC BLOOD PRESSURE: 72 MMHG | WEIGHT: 187.81 LBS | SYSTOLIC BLOOD PRESSURE: 120 MMHG | TEMPERATURE: 98 F | HEIGHT: 71 IN

## 2020-02-06 DIAGNOSIS — E13.9 DIABETES 1.5, MANAGED AS TYPE 2: ICD-10-CM

## 2020-02-06 DIAGNOSIS — C22.1 CHOLANGIOCARCINOMA: Primary | ICD-10-CM

## 2020-02-06 PROCEDURE — 99213 OFFICE O/P EST LOW 20 MIN: CPT | Performed by: SURGERY

## 2020-02-06 NOTE — PATIENT INSTRUCTIONS
Your consent will be faxed to [facility] and they will contact you for your pre op visit.    At that visit they will give you the time to arrive at their facility for surgery on 2/14/20

## 2020-02-10 LAB

## 2020-02-12 ENCOUNTER — TELEPHONE (OUTPATIENT)
Dept: NEUROLOGY | Facility: HOSPITAL | Age: 70
End: 2020-02-12

## 2020-02-12 NOTE — TELEPHONE ENCOUNTER
Spoke with pt and gave him the pre op orders from . Clear liquid diet day before surgery. Mag citrate 1 bottle the day prior to surgery. Npo after midnight night before surgery. Pt has Hibiclens soap. He will get the mag citrate. Talk to him about the Listerine the night before and morning of surgery. Pt verbalized understanding

## 2020-02-12 NOTE — TELEPHONE ENCOUNTER
Called Mela Westbrook [phone] opt [phone] ext [phone] [facility] to return this call need fax no. To send clinic notes that she needs

## 2020-02-12 NOTE — TELEPHONE ENCOUNTER
Called pt and lvm. Told him to call [facility] pre op dept. They should answer any question that he may have. And to call me back should he need to.

## 2020-02-12 NOTE — TELEPHONE ENCOUNTER
Spoke with pt and he is expecting to her from Dr. Pro about his PET scan that he did yesterday. Called Dr. Pro to let him know and he said to call pt tell him the Pet scan look good, no surprises and he will talk with him tomorrow. Pt verbalzied understanding

## 2020-02-12 NOTE — TELEPHONE ENCOUNTER
----- Message from Elisa Garcia sent at 2/12/2020 10:19 AM CST -----  Contact: self [phone]  WEI - Patient is calling to speak with you about a preop diet. He nesd to know if he can do protein shakes are just liquid  Please call

## 2020-02-12 NOTE — TELEPHONE ENCOUNTER
----- Message from Makenzie Dexter sent at 2/12/2020  9:59 AM CST -----  Contact: Mela with [employer] - [phone] opt [phone]  WEI : Needs clinical notes. Please advise

## 2020-02-24 ENCOUNTER — TELEPHONE (OUTPATIENT)
Dept: NEUROLOGY | Facility: HOSPITAL | Age: 70
End: 2020-02-24

## 2020-02-26 ENCOUNTER — TELEPHONE (OUTPATIENT)
Dept: NEUROLOGY | Facility: HOSPITAL | Age: 70
End: 2020-02-26

## 2020-02-26 NOTE — TELEPHONE ENCOUNTER
Incoming call from Di Gomez . She wanted to know what otc pain med the pt could take. I looked at his med list and he was taking Advil. I told her that he could continue with that med.

## 2020-02-28 NOTE — PROGRESS NOTES
" NOLANETS:  [facility]  A collaboration between [facility] and [facility]        Chief Complaint:  post op follow up    S/p: 2/14/20 Partial Hepatectomy w lysis of adhesion/hernia, Hernia repair and Lap Erum     Pathology:       Vital Sign:   Vitals:    03/02/20 1126   BP: 122/77   Pulse: 87   Temp: 97.7 °F (36.5 °C)   TempSrc: Oral   Weight: 80.3 kg (177 lb 0.5 oz)   Height: 5' 11" (1.803 m)         Incision: healing well    Appetite: increased    Restrictions: NO Restrictions    Return to clinic: 2 weeks                  "

## 2020-03-02 ENCOUNTER — OFFICE VISIT (OUTPATIENT)
Dept: NEUROLOGY | Facility: HOSPITAL | Age: 70
End: 2020-03-02
Attending: SURGERY
Payer: MEDICARE

## 2020-03-02 VITALS
DIASTOLIC BLOOD PRESSURE: 77 MMHG | SYSTOLIC BLOOD PRESSURE: 122 MMHG | BODY MASS INDEX: 24.78 KG/M2 | TEMPERATURE: 98 F | HEART RATE: 87 BPM | HEIGHT: 71 IN | WEIGHT: 177 LBS

## 2020-03-02 DIAGNOSIS — Z09 POSTOP CHECK: Primary | ICD-10-CM

## 2020-03-02 PROCEDURE — 99215 OFFICE O/P EST HI 40 MIN: CPT | Performed by: SURGERY

## 2020-03-02 RX ORDER — PREGABALIN 50 MG/1
CAPSULE ORAL
COMMUNITY
Start: 2020-02-23

## 2020-03-02 RX ORDER — IRON,CARB/VIT C/VIT B12/FOLIC 100-250-1
TABLET ORAL
COMMUNITY
Start: 2020-02-24

## 2020-03-02 RX ORDER — BUMETANIDE 0.5 MG/1
TABLET ORAL
COMMUNITY
Start: 2020-02-24

## 2020-03-02 RX ORDER — SPIRONOLACTONE 100 MG/1
TABLET, FILM COATED ORAL
COMMUNITY
Start: 2020-02-23

## 2020-03-02 RX ORDER — ONDANSETRON 4 MG/1
TABLET, FILM COATED ORAL
COMMUNITY
Start: 2020-02-23

## 2020-03-02 RX ORDER — TRAMADOL HYDROCHLORIDE 50 MG/1
TABLET ORAL
COMMUNITY
Start: 2020-02-23

## 2020-03-02 RX ORDER — PANTOPRAZOLE SODIUM 40 MG/1
TABLET, DELAYED RELEASE ORAL
COMMUNITY
Start: 2020-02-23

## 2020-03-02 RX ORDER — TAMSULOSIN HYDROCHLORIDE 0.4 MG/1
CAPSULE ORAL
COMMUNITY
Start: 2020-02-23

## 2020-03-02 RX ORDER — BLOOD SUGAR DIAGNOSTIC
STRIP MISCELLANEOUS
COMMUNITY
Start: 2020-02-27

## 2020-03-02 RX ORDER — URSODIOL 300 MG/1
CAPSULE ORAL
COMMUNITY
Start: 2020-02-23

## 2020-03-04 ENCOUNTER — EXTERNAL HOME HEALTH (OUTPATIENT)
Dept: HOME HEALTH SERVICES | Facility: HOSPITAL | Age: 70
End: 2020-03-04

## 2020-03-09 ENCOUNTER — TELEPHONE (OUTPATIENT)
Dept: NEUROLOGY | Facility: HOSPITAL | Age: 70
End: 2020-03-09

## 2020-03-09 NOTE — TELEPHONE ENCOUNTER
----- Message from Kathleen Segovia sent at 3/9/2020  8:40 AM CDT -----  Contact: Pt/ [phone]  WEI-----Pt is calling in regards to inform the office that [facility] stated that the pt need to Orders to be sent over with Diagnosis Code for the pt.    Please call and advise.

## 2020-03-09 NOTE — TELEPHONE ENCOUNTER
Spoke with pt and he states that ABILIO needed diagnose codes in order to do his labs and ct scan. The codes are   Partial Hepatectomy  Z90.89  Lysis of Adhesion      K43.0  Hernia repair          K43.0  Lap zaire         Z53.31   ABILIO fax is [phone]. The codes were put on his order sheet and put the codes on the sheet and faxed the sheet

## 2020-03-12 LAB
EXT 24 HR UR METANEPHRINE: ABNORMAL
EXT 24 HR UR NORMETANEPHRINE: ABNORMAL
EXT 24 HR UR NORMETANEPHRINE: ABNORMAL
EXT 25 HYDROXY VIT D2: ABNORMAL
EXT 25 HYDROXY VIT D3: ABNORMAL
EXT 5 HIAA 24 HR URINE: ABNORMAL
EXT 5 HIAA BLOOD: ABNORMAL
EXT ACTH: ABNORMAL
EXT AFP: ABNORMAL
EXT ALBUMIN: 4.2 GRAM/DL (ref 3.5–5)
EXT ALKALINE PHOSPHATASE: 118 UNIT/L (ref 38–126)
EXT ALT: 54 UNIT/L (ref 7–56)
EXT AMYLASE: ABNORMAL
EXT ANTI ISLET CELL AB: ABNORMAL
EXT ANTI PARIETAL CELL AB: ABNORMAL
EXT ANTI THYROID AB: ABNORMAL
EXT AST: 36 UNIT/L (ref 7–40)
EXT BILIRUBIN DIRECT: ABNORMAL
EXT BILIRUBIN TOTAL: 0.4 MG/DL (ref 0–1.2)
EXT BK VIRUS DNA QN PCR: ABNORMAL
EXT BUN: 22 MG/DL (ref 7–21)
EXT C PEPTIDE: ABNORMAL
EXT CA 125: ABNORMAL
EXT CA 19-9: ABNORMAL
EXT CA 27-29: ABNORMAL
EXT CALCITONIN: ABNORMAL
EXT CALCIUM: 9.7 MG/DL (ref 8.5–10.3)
EXT CEA: ABNORMAL
EXT CHLORIDE: 95 MEQ/L (ref 98–107)
EXT CHOLESTEROL: ABNORMAL
EXT CHROMOGRANIN A: ABNORMAL
EXT CO2: 23 MEQ/L (ref 21–31)
EXT CREATININE UA: ABNORMAL
EXT CREATININE: 1 MG/DL (ref 0.7–1.2)
EXT CYCLOSPORONE LEVEL: ABNORMAL
EXT DOPAMINE: ABNORMAL
EXT EBV DNA BY PCR: ABNORMAL
EXT EPINEPHRINE: ABNORMAL
EXT FOLATE: ABNORMAL
EXT FREE T3: ABNORMAL
EXT FREE T4: ABNORMAL
EXT FSH: ABNORMAL
EXT GASTRIN RELEASING PEPTIDE: ABNORMAL
EXT GASTRIN RELEASING PEPTIDE: ABNORMAL
EXT GASTRIN: ABNORMAL
EXT GGT: ABNORMAL
EXT GHRELIN: ABNORMAL
EXT GLUCAGON: ABNORMAL
EXT GLUCOSE: 230 MG/DL (ref 70–100)
EXT GROWTH HORMONE: ABNORMAL
EXT HCV RNA QUANT PCR: ABNORMAL
EXT HDL: ABNORMAL
EXT HEMATOCRIT: 40.2 % (ref 40–52)
EXT HEMOGLOBIN A1C: ABNORMAL
EXT HEMOGLOBIN: 13.5 GRAM/DL (ref 13.6–17.5)
EXT HISTAMINE 24 HR URINE: ABNORMAL
EXT HISTAMINE: ABNORMAL
EXT IGF-1: ABNORMAL
EXT IMMUNKNOW (NON-STIMULATED): ABNORMAL
EXT IMMUNKNOW (STIMULATED): ABNORMAL
EXT INR: 1 SECONDS (ref 0.8–1.2)
EXT INSULIN: ABNORMAL
EXT LANREOTIDE LEVEL: ABNORMAL
EXT LDH, TOTAL: ABNORMAL
EXT LDL CHOLESTEROL: ABNORMAL
EXT LIPASE: ABNORMAL
EXT MAGNESIUM: 1.9 MG/DL (ref 1.7–2.2)
EXT METANEPHRINE FREE PLASMA: ABNORMAL
EXT MOTILIN: ABNORMAL
EXT NEUROKININ A CAMB: ABNORMAL
EXT NEUROKININ A ISI: ABNORMAL
EXT NEUROTENSIN: ABNORMAL
EXT NOREPINEPHRINE: ABNORMAL
EXT NORMETANEPHRINE: ABNORMAL
EXT NSE: ABNORMAL
EXT OCTREOTIDE LEVEL: ABNORMAL
EXT PANCREASTATIN CAMB: ABNORMAL
EXT PANCREASTATIN ISI: ABNORMAL
EXT PANCREATIC POLYPEPTIDE: ABNORMAL
EXT PHOSPHORUS: 3.3 MG/DL (ref 2.4–4.4)
EXT PLATELETS: 168 K/UL (ref 150–350)
EXT POTASSIUM: 4.1 MEQ/L (ref 3.5–5)
EXT PROGRAF LEVEL: ABNORMAL
EXT PROLACTIN: ABNORMAL
EXT PROTEIN TOTAL: 7.4 GRAM/DL (ref 6.3–8.2)
EXT PROTEIN UA: ABNORMAL
EXT PT: 12.9 SECONDS (ref 12.3–14.7)
EXT PTH, INTACT: ABNORMAL
EXT PTT: ABNORMAL
EXT RAPAMUNE LEVEL: ABNORMAL
EXT SEROTONIN: ABNORMAL
EXT SODIUM: 135 MEQ/L (ref 135–145)
EXT SOMATOSTATIN: ABNORMAL
EXT SUBSTANCE P: ABNORMAL
EXT TRIGLYCERIDES: ABNORMAL
EXT TRYPTASE: ABNORMAL
EXT TSH: ABNORMAL
EXT URIC ACID: ABNORMAL
EXT URINE AMYLASE U/HR: ABNORMAL
EXT URINE AMYLASE U/L: ABNORMAL
EXT VASOACTIVE INTESTINAL POLYPEPTIDE: ABNORMAL
EXT VITAMIN B12: ABNORMAL
EXT VMA 24 HR URINE: ABNORMAL
EXT WBC: 2.9 K/UL (ref 4.5–11)
NEURON SPECIFIC ENOLASE: ABNORMAL

## 2020-03-13 NOTE — PROGRESS NOTES
" NOLANETS:  [facility]  A collaboration between [facility] and [facility]        Chief Complaint:  post op follow up    S/p:    -  2/14/20 Partial Hepatectomy w lysis of adhesion/hernia, Hernia repair and Lap Erum     Pathology: :       Vital Sign:   Vitals:    03/16/20 1452   BP: 138/85   Pulse: 78   Weight: 83.4 kg (183 lb 13.8 oz)   Height: 5' 11" (1.803 m)         Incision: {looks good    Appetite: good       Restrictions: NO Restrictions    Return to clinic: 3 months     To see Dr. Casarez    CT scan from [facility] report seen, looks good    Will discuss with Dr. Baeza/Hermelindo    Blood sugar high to see endocrinologist              "

## 2020-03-16 ENCOUNTER — OFFICE VISIT (OUTPATIENT)
Dept: NEUROLOGY | Facility: HOSPITAL | Age: 70
End: 2020-03-16
Attending: SURGERY
Payer: MEDICARE

## 2020-03-16 VITALS
HEART RATE: 78 BPM | DIASTOLIC BLOOD PRESSURE: 85 MMHG | BODY MASS INDEX: 25.74 KG/M2 | HEIGHT: 71 IN | WEIGHT: 183.88 LBS | SYSTOLIC BLOOD PRESSURE: 138 MMHG

## 2020-03-16 DIAGNOSIS — Z09 POSTOP CHECK: Primary | ICD-10-CM

## 2020-03-16 PROCEDURE — 99213 OFFICE O/P EST LOW 20 MIN: CPT | Performed by: SURGERY

## 2020-03-18 ENCOUNTER — TELEPHONE (OUTPATIENT)
Dept: NEUROLOGY | Facility: HOSPITAL | Age: 70
End: 2020-03-18

## 2020-03-18 NOTE — TELEPHONE ENCOUNTER
----- Message from Shakila Sosa sent at 3/17/2020  2:53 PM CDT -----  Contact: [phone]/self   WEI    Patient is calling to let office know the medications he is taking Post Op .   ICAR-C PLUS Tab 1 BY MOUTH EVERY DAY   ursodiol (ACTIGALL) 300 mg capsule 2 BY MOUTH EVERY DAY .   Please advise.

## 2020-03-27 ENCOUNTER — TELEPHONE (OUTPATIENT)
Dept: NEUROLOGY | Facility: HOSPITAL | Age: 70
End: 2020-03-27

## 2020-03-27 NOTE — TELEPHONE ENCOUNTER
Called pt and lvm letting him know that there at no notes that reflect his conference  Pt called and said he was suppose to have a virtual visit with Dr. Casarez and she was out sick and next week he will have a virtual visit with another physician in that office. Will let pt know what Dr. Pro says with his conference with Dr. Casarez. Pt verbalized understanding

## 2020-03-27 NOTE — TELEPHONE ENCOUNTER
----- Message from Chase Rodgers sent at 3/27/2020 10:00 AM CDT -----  Contact: Dr Lon Griffin office/[phone]  Shakila called to get billing codes for the surgery that both doctors did at [facility] on 02/14/2020.    Please call [phone] today.

## 2020-03-27 NOTE — TELEPHONE ENCOUNTER
----- Message from Kathleen Segovia sent at 3/27/2020  2:25 PM CDT -----  Contact: Pt [phone]  WEI---Pt is requesting a callback in regards to a follow up on Dr. Pro conference with Dr. Casarez and what is the results.    Please call and advise

## 2020-04-01 ENCOUNTER — TELEPHONE (OUTPATIENT)
Dept: NEUROLOGY | Facility: HOSPITAL | Age: 70
End: 2020-04-01

## 2020-04-01 NOTE — TELEPHONE ENCOUNTER
----- Message from Kathleen Segovia sent at 3/31/2020 10:35 AM CDT -----  Contact: Shakila [phone]  WEI----Shakila GO is requesting a callback in regards to the pt.    Please call and advise.

## 2020-04-01 NOTE — TELEPHONE ENCOUNTER
----- Message from Kathleen Segovia sent at 4/1/2020 11:05 AM CDT -----  Contact: Shakila valle/ Dr. Griffin Office [phone]  [facility]----Shakila valle/ Dr. Griffin office is calling in regards to Parvez Ashrafis MRN   [id number].    Please call and advise.

## 2020-05-12 ENCOUNTER — TELEPHONE (OUTPATIENT)
Dept: NEUROLOGY | Facility: HOSPITAL | Age: 70
End: 2020-05-12

## 2020-05-12 NOTE — TELEPHONE ENCOUNTER
Pt requesting if ok to restart jardiance and fenofibrate.  Pt had appt with endocrinology and this was not addressed.  Pt advised to contact Endo, and request to be forwarded to Dr. Pro for guidance.  Pt acknowledged understanding.

## 2020-05-12 NOTE — TELEPHONE ENCOUNTER
----- Message from Lynne Cotter sent at 5/11/2020  4:47 PM CDT -----  WEI- Patient called in regarding his medication, wants to know whether or not to continue with his medication. Patient would like someone to give him a call back as soon as possible regarding his concern. Thank you! His best contact number is [phone]

## 2020-06-15 ENCOUNTER — OFFICE VISIT (OUTPATIENT)
Dept: NEUROLOGY | Facility: HOSPITAL | Age: 70
End: 2020-06-15
Attending: SURGERY
Payer: MEDICARE

## 2020-06-15 VITALS
HEART RATE: 82 BPM | DIASTOLIC BLOOD PRESSURE: 88 MMHG | WEIGHT: 180.56 LBS | SYSTOLIC BLOOD PRESSURE: 160 MMHG | BODY MASS INDEX: 25.18 KG/M2 | TEMPERATURE: 97 F

## 2020-06-15 DIAGNOSIS — C22.1 CHOLANGIOCARCINOMA: Primary | ICD-10-CM

## 2020-06-15 PROCEDURE — 99215 OFFICE O/P EST HI 40 MIN: CPT | Performed by: SURGERY

## 2020-06-15 RX ORDER — AMLODIPINE BESYLATE 2.5 MG/1
TABLET ORAL
COMMUNITY
Start: 2020-05-28

## 2020-06-15 RX ORDER — CAPECITABINE 500 MG/1
TABLET, FILM COATED ORAL
COMMUNITY
Start: 2020-06-11

## 2020-06-15 NOTE — PROGRESS NOTES
NOLANETS:  [facility]  A collaboration between [facility] and [facility]      PATIENT: Parvez Rossi  MRN: [id number]  DATE: 6/15/2020    Subjective:      Chief Complaint: No chief complaint on file.   doing okay  Completed radiation 2 weeks back  Oral chemotherapy agent a because of poor tolerance he is on half a dose H. Recovering from the rash from the torso.  He had issues with poor appetite but is better now because of the have decreased dosage    Vitals: Blood pressure (!) 160/88, pulse 82, temperature 97.3 °F (36.3 °C), temperature source Oral, weight 81.9 kg (180 lb 8.9 oz).     ECOG Score: 1 - Symptomatic but completely ambulatory    Diagnosis: No diagnosis found.     Interval History:     Oncologic History:   Oncologic History    Oncologic Treatment    Pathology      Past Medical History:  Past Medical History:   Diagnosis Date    Diabetes mellitus     Diabetes mellitus, type 2     Hyperlipidemia     Hypertension        Past Surgical History:  Past Surgical History:   Procedure Laterality Date    HERNIA REPAIR      SMALL INTESTINE SURGERY      TONSILLECTOMY         Family History:  No family history on file.    Allergies:  Patient has no known allergies.    Medications:   Current Outpatient Medications   Medication Sig    ACCU-CHEK NERY PLUS TEST STRP Strp     amLODIPine (NORVASC) 2.5 MG tablet     aspirin (ECOTRIN) 81 MG EC tablet Take 81 mg by mouth.    capecitabine (XELODA) 500 MG Tab     fish oil-omega-3 fatty acids 300-1,000 mg capsule Take 2 g by mouth once daily.    ibuprofen (ADVIL,MOTRIN) 200 MG tablet Take 200 mg by mouth every 6 (six) hours as needed for Pain.    insulin asp prt-insulin aspart, NOVOLOG 70/30, (NOVOLOG 70/30) 100 unit/mL (70-30) Soln     insulin aspart (NOVOLOG) 100 unit/mL injection Inject 70 Units into the skin 3 (three) times daily before meals.    LEVEMIR U-100  INSULIN 100 unit/mL injection     metformin (GLUCOPHAGE) 500 MG tablet Take 1,000 mg by mouth 4 (four) times daily.     omega 3-dha-epa-fish oil 1,000 mg (120 mg-180 mg) Cap Take 2 capsules by mouth.    ondansetron (ZOFRAN) 4 MG tablet     ondansetron (ZOFRAN-ODT) 4 MG TbDL Take 2 tablets (8 mg total) by mouth every 8 (eight) hours as needed.    pantoprazole (PROTONIX) 40 MG tablet     pregabalin (LYRICA) 50 MG capsule     bumetanide (BUMEX) 0.5 MG Tab     empagliflozin (JARDIANCE ORAL) Take by mouth.    evolocumab (REPATHA SURECLICK) 140 mg/mL PnIj     fenofibrate (TRIGLIDE) 50 MG tablet Take 160 mg by mouth once daily.     fenofibrate 160 MG Tab     ICAR-C PLUS Tab     insulin glargine (LANTUS) 100 unit/mL injection Inject 50 Units into the skin every evening.    irbesartan (AVAPRO) 75 MG tablet Take 300 mg by mouth every evening.     oxycodone-acetaminophen (PERCOCET)  mg per tablet Take 1 tablet by mouth every 4 to 6 hours as needed. (Patient not taking: Reported on 3/16/2020)    spironolactone (ALDACTONE) 100 MG tablet     tamsulosin (FLOMAX) 0.4 mg Cap     traMADol (ULTRAM) 50 mg tablet     ursodiol (ACTIGALL) 300 mg capsule      No current facility-administered medications for this visit.         Review of Systems   Constitutional: Positive for appetite change and fatigue. Negative for activity change, diaphoresis and unexpected weight change.   HENT: Negative for congestion, dental problem, drooling, ear discharge, ear pain, facial swelling, hearing loss, mouth sores, nosebleeds, postnasal drip, rhinorrhea, sinus pressure, sinus pain, sneezing, sore throat, tinnitus, trouble swallowing and voice change.    Eyes: Negative for pain, discharge, redness, itching and visual disturbance.   Respiratory: Negative for apnea, cough, choking, chest tightness, shortness of breath, wheezing and stridor.    Cardiovascular: Negative for chest pain, palpitations and leg swelling.   Gastrointestinal:  Negative for abdominal distention, abdominal pain, anal bleeding, blood in stool, constipation, nausea, rectal pain and vomiting.   Endocrine: Negative.    Genitourinary: Negative.    Musculoskeletal: Negative for gait problem, joint swelling, myalgias, neck pain and neck stiffness.   Skin: Positive for color change, pallor and rash.   Allergic/Immunologic: Negative.    Neurological: Negative for seizures, syncope, speech difficulty, light-headedness and headaches.   Hematological: Does not bruise/bleed easily.   Psychiatric/Behavioral: Negative for behavioral problems, confusion, decreased concentration and dysphoric mood.      Objective:      Physical Exam  Constitutional:       Appearance: Normal appearance. He is normal weight.   HENT:      Head: Normocephalic and atraumatic.      Nose: Nose normal.      Mouth/Throat:      Mouth: Mucous membranes are moist.   Eyes:      Pupils: Pupils are equal, round, and reactive to light.   Neck:      Musculoskeletal: Normal range of motion and neck supple.   Cardiovascular:      Rate and Rhythm: Normal rate and regular rhythm.      Pulses: Normal pulses.   Pulmonary:      Effort: Pulmonary effort is normal. No respiratory distress.      Breath sounds: Normal breath sounds. No wheezing.   Abdominal:      General: Abdomen is flat. Bowel sounds are normal. There is no distension.      Tenderness: There is no abdominal tenderness. There is left CVA tenderness. There is no guarding.      Comments: A midline incision completely healed   Musculoskeletal: Normal range of motion.   Skin:     General: Skin is warm.      Capillary Refill: Capillary refill takes more than 3 seconds.   Neurological:      General: No focal deficit present.      Mental Status: He is alert.   Psychiatric:         Mood and Affect: Mood normal.        Assessment:       No diagnosis found.    Laboratory Data:       Scans:   CT Renal Stone Study ABD Pelvis WO  Narrative: EXAMINATION:  CT RENAL STONE STUDY ABD  PELVIS WO    CLINICAL HISTORY:  fred varicocoele; Disorder of male genital organs, unspecified    TECHNIQUE:  Low dose axial images, sagittal and coronal reformations were obtained from the lung bases to the pubic symphysis.    COMPARISON:  Ultrasound scrotum and testicle 09/27/2019    FINDINGS:  Right kidney: There are 5 punctate nonobstructing stones in the right kidney. There is no right hydronephrosis.  The collecting system and right ureter demonstrate no evidence of obstruction.  .    Left kidney: There are 2 punctate nonobstructing stone in the left kidney.  There is no left hydronephrosis.  The left kidney collecting system and left ureter demonstrate no evidence of stone or obstruction.    The urinary bladder is unremarkable.  The prostate is mildly enlarged with dystrophic calcifications.  There is a small right hydrocele and left inguinal hernia with fat content.  In the left femoral soft tissue, there is presence of multiple subcentimeter hyperdense material, which may represent surgical clips or dense calcifications.    There are no retroperitoneal masses.    The liver is mildly enlarged and demonstrates diffuse low attenuation, suggestive of steatosis.  There is an ill-defined focal area of hypoattenuation in the right hepatic lobe.  This may represent hepatic mass or area of focal fatty sparing.  Follow-up with dedicated CT or MRI is strongly advised.  There is also an another adjacent focus with demonstrate fluid attenuation favored to represent hepatic cyst.    The gallbladder is contracted and without calcified stones.  The spleen is unremarkable.  There is mild calcification of the splenic artery.  The adrenals and pancreas are unremarkable.    The stomach is unremarkable.  There are postsurgical changes at the distal small bowel with focal dilatation of the operative segment.  There is no dilatation of bowel proximal or distal to this segment and no bowel wall thickening.  There are scattered  colonic diverticuli without evidence of diverticulitis.    No ascites or lymphadenopathy.    The visualized lung bases demonstrate mild dependent atelectasis.  The heart is normal in size.  There are coronary artery calcifications and mild calcific atherosclerosis of the abdominal aorta.  No aortic aneurysms.    The osseous structures demonstrate age-appropriate degenerative changes.  Impression: 1. Ill-defined focal area of hypoattenuation in the right hepatic lobe, which may represent hepatic mass or area of focal fatty sparing.  Follow-up with dedicated CT (triple phase) or MRI of the liver is advised.  2. Hepatic steatosis and additional focus of fluid attenuation favored to represent hepatic cyst.  3. Non-obstructing stones in the bilateral kidneys.  4. Enlarged prostate with dystrophic calcifications.  5. Left inguinal hernia with fat content.  Small right hydrocele.  6. Postoperative changes involving the distal small bowel with focal dilatation of this segment of bowel.  This report was flagged in Epic as abnormal.    Electronically signed by resident: Sherine Sprague  Date:    10/07/2019  Time:    09:01    Electronically signed by: Junior Chandra MD  Date:    10/07/2019  Time:    09:50       Impression:  69-year-old gentleman  underwent robotic right hepatectomy for primary cholangiocarcinoma of the right lobe.  His tumor was encircling the main hepatic vein.  The tumor is positive at the margin even though she he underwent ablation at the margin.  He he was found to have significant fatty liver more than 40% therefore area extended resection could not be done.  Of immediately following surgery he had small for size syndrome features of high ascites output.  He gradually improved.  Postoperatively he received radiation to the to that area and also he was placed on oral chemo agent.  He has completed the radiation treatment and he still has a formal rounds of oral chemo where he gets his chemo for 2 weeks  and then off chemo for about a week.    His weight is stable his blood sugar is here wire.  I have ordered for CT scan of the abdomen to look at the at the liver.  Also I strongly recommended that he undergo stress reduction measures so that his blood sugar will be better  He is very strict with his diet still the blood sugar is up most likely it is because of the stress related issues    There is no hernia for any PET scan now as he just completed his radiation.  Will discuss this with his oncologist.    Recommended to focus on protein diet healthy nutrition and healthy lifestyle in terms of activity    Follow-up in 3 months time.  I will also call him back with his CT scan report and also the recommendation from the oncologist        SMITH Tran MD, FACS   Associate Professor of Surgery, [facility]   Neuroendocrine Surgery, Hepatic/Pancreatic & General Surgery   [address]   [place] [address]   ph. [phone]; [phone]   fax. [phone]

## 2020-06-15 NOTE — PATIENT INSTRUCTIONS
3 month follow up scheduled on Thursday, September 17, 2020 at 140pm.    CT ab/pelvis orders given to pt.

## 2020-06-18 ENCOUNTER — TELEPHONE (OUTPATIENT)
Dept: NEUROLOGY | Facility: HOSPITAL | Age: 70
End: 2020-06-18

## 2020-10-23 ENCOUNTER — TELEPHONE (OUTPATIENT)
Dept: NEUROLOGY | Facility: HOSPITAL | Age: 70
End: 2020-10-23

## 2020-10-23 NOTE — TELEPHONE ENCOUNTER
----- Message from Alphonse Wilkins sent at 10/23/2020  1:44 PM CDT -----  Contact: PATIENT [phone]  Parvez Rossi calling REQUESTING TO SPEAK WITH THE NURSE REGARDING SCHEDULING   Please advise

## 2020-11-03 NOTE — PROGRESS NOTES
"NOLANETS:  [facility]  A collaboration between [facility] and [facility]      PATIENT: Parvez Rossi  MRN: [id number]  DATE: 11/9/2020    Subjective:      Chief Complaint: hospital f/u  70-year-old gentleman who had a cholangiocarcinoma underwent a right hepatic lobectomy was on postop radiation and chemotherapy he did not tolerate chemo really well September he was hospitalized for multiple issues with chemo therapy induced issues such as cytopenia.  His chemotherapy has been stopped now he is feeling somewhat better.    Vitals: Blood pressure 129/67, pulse 81, temperature 97.7 °F (36.5 °C), temperature source Oral, height 5' 11" (1.803 m), weight 81.6 kg (179 lb 14.3 oz). --    ECOG Score: 1 - Symptomatic but completely ambulatory    Diagnosis: No diagnosis found.     Interval History:    6/15/2020  69-year-old gentleman  underwent robotic right hepatectomy for primary cholangiocarcinoma of the right lobe.  His tumor was encircling the main hepatic vein.  The tumor is positive at the margin even though she he underwent ablation at the margin.  He he was found to have significant fatty liver more than 40% therefore area extended resection could not be done.  Of immediately following surgery he had small for size syndrome features of high ascites output.  He gradually improved.  Postoperatively he received radiation to the to that area and also he was placed on oral chemo agent.  He has completed the radiation treatment and he still has a formal rounds of oral chemo where he gets his chemo for 2 weeks and then off chemo for about a week.     His weight is stable his blood sugar is here wire.  I have ordered for CT scan of the abdomen to look at the at the liver.  Also I strongly recommended that he undergo stress reduction measures so that his blood sugar will be better  He is very strict with his diet still the blood sugar " is up most likely it is because of the stress related issues     There is no hernia for any PET scan now as he just completed his radiation.  Will discuss this with his oncologist.     Recommended to focus on protein diet healthy nutrition and healthy lifestyle in terms of activity     Follow-up in 3 months time.  I will also call him back with his CT scan report and also the recommendation from the oncologist    Oncologic History:   Oncologic History 3/2020 cholangiocarcinoma   Oncologic Treatment 6/2020 oral chemo   Pathology      Past Medical History:  Past Medical History:   Diagnosis Date    Diabetes mellitus     Diabetes mellitus, type 2     Hyperlipidemia     Hypertension        Past Surgical History:  Past Surgical History:   Procedure Laterality Date    HERNIA REPAIR      SMALL INTESTINE SURGERY      TONSILLECTOMY         Family History:  No family history on file.    Allergies:  Atorvastatin and Statins-hmg-coa reductase inhibitors    Medications:   Current Outpatient Medications   Medication Sig    ACCU-CHEK NERY PLUS TEST STRP Strp     amLODIPine (NORVASC) 2.5 MG tablet     aspirin (ECOTRIN) 81 MG EC tablet Take 81 mg by mouth.    bumetanide (BUMEX) 0.5 MG Tab     capecitabine (XELODA) 500 MG Tab     clopidogreL (PLAVIX) 75 mg tablet Take 75 mg by mouth.    empagliflozin (JARDIANCE ORAL) Take by mouth.    evolocumab (REPATHA SURECLICK) 140 mg/mL PnIj     fenofibrate (TRIGLIDE) 50 MG tablet Take 160 mg by mouth once daily.     fenofibrate 160 MG Tab     fish oil-omega-3 fatty acids 300-1,000 mg capsule Take 2 g by mouth once daily.    furosemide (LASIX) 20 MG tablet TK 1 T PO QD PRN    ibuprofen (ADVIL,MOTRIN) 200 MG tablet Take 200 mg by mouth every 6 (six) hours as needed for Pain.    ICAR-C PLUS Tab     insulin asp prt-insulin aspart, NOVOLOG 70/30, (NOVOLOG 70/30) 100 unit/mL (70-30) Soln     insulin aspart (NOVOLOG) 100 unit/mL injection Inject 70 Units into the skin 3 (three)  times daily before meals.    insulin glargine (LANTUS) 100 unit/mL injection Inject 50 Units into the skin every evening.    irbesartan (AVAPRO) 75 MG tablet Take 300 mg by mouth every evening.     LEVEMIR U-100 INSULIN 100 unit/mL injection     metformin (GLUCOPHAGE) 500 MG tablet Take 1,000 mg by mouth 4 (four) times daily.     metoprolol succinate (TOPROL-XL) 50 MG 24 hr tablet     omega 3-dha-epa-fish oil 1,000 mg (120 mg-180 mg) Cap Take 2 capsules by mouth.    ondansetron (ZOFRAN) 4 MG tablet     ondansetron (ZOFRAN-ODT) 4 MG TbDL Take 2 tablets (8 mg total) by mouth every 8 (eight) hours as needed.    pantoprazole (PROTONIX) 40 MG tablet     pimecrolimus (ELIDEL) 1 % cream 1 application every evening. Apply to face    pregabalin (LYRICA) 50 MG capsule     spironolactone (ALDACTONE) 100 MG tablet     tamsulosin (FLOMAX) 0.4 mg Cap     traMADol (ULTRAM) 50 mg tablet     ursodiol (ACTIGALL) 300 mg capsule     oxycodone-acetaminophen (PERCOCET)  mg per tablet Take 1 tablet by mouth every 4 to 6 hours as needed. (Patient not taking: Reported on 3/16/2020)     No current facility-administered medications for this visit.         Review of Systems   Constitutional: Positive for fatigue. Negative for appetite change, chills, fever and unexpected weight change.   HENT: Negative for congestion, dental problem, drooling, ear discharge, ear pain, facial swelling, hearing loss, mouth sores, nosebleeds, postnasal drip, rhinorrhea, sinus pressure, sinus pain, sneezing, sore throat, tinnitus, trouble swallowing and voice change.    Eyes: Negative for photophobia, pain, discharge, redness, itching and visual disturbance.   Respiratory: Negative for apnea, cough, choking, chest tightness, shortness of breath, wheezing and stridor.    Cardiovascular: Negative for chest pain, palpitations and leg swelling.   Gastrointestinal: Negative for abdominal pain, anal bleeding and blood in stool.   Endocrine:  Negative for heat intolerance and polyphagia.   Genitourinary: Negative.    Musculoskeletal: Negative for back pain, myalgias, neck pain and neck stiffness.   Skin: Negative for pallor, rash and wound.   Allergic/Immunologic: Negative.    Neurological: Negative for seizures, syncope, facial asymmetry and speech difficulty.   Hematological: Negative.    Psychiatric/Behavioral: Negative.       Objective:      Physical Exam  Constitutional:       Appearance: Normal appearance. He is normal weight.   HENT:      Head: Normocephalic.      Nose: Nose normal.   Eyes:      Pupils: Pupils are equal, round, and reactive to light.   Neck:      Musculoskeletal: No neck rigidity or muscular tenderness.      Vascular: No carotid bruit.   Cardiovascular:      Rate and Rhythm: Normal rate and regular rhythm.      Pulses: Normal pulses.      Heart sounds: Normal heart sounds.   Pulmonary:      Effort: Pulmonary effort is normal.      Breath sounds: Normal breath sounds.   Abdominal:      General: Abdomen is flat. Bowel sounds are normal.      Palpations: Abdomen is soft. There is no mass.      Tenderness: There is no right CVA tenderness, left CVA tenderness or guarding.      Hernia: No hernia is present.   Musculoskeletal: Normal range of motion.   Lymphadenopathy:      Cervical: No cervical adenopathy.   Skin:     General: Skin is warm and dry.   Neurological:      General: No focal deficit present.      Mental Status: He is alert and oriented to person, place, and time.   Psychiatric:         Mood and Affect: Mood normal.        Assessment:       No diagnosis found.    Laboratory Data:       Scans:   CT Renal Stone Study ABD Pelvis WO  Narrative: EXAMINATION:  CT RENAL STONE STUDY ABD PELVIS WO    CLINICAL HISTORY:  fred varicocoele; Disorder of male genital organs, unspecified    TECHNIQUE:  Low dose axial images, sagittal and coronal reformations were obtained from the lung bases to the pubic symphysis.    COMPARISON:  Ultrasound  scrotum and testicle 09/27/2019    FINDINGS:  Right kidney: There are 5 punctate nonobstructing stones in the right kidney. There is no right hydronephrosis.  The collecting system and right ureter demonstrate no evidence of obstruction.  .    Left kidney: There are 2 punctate nonobstructing stone in the left kidney.  There is no left hydronephrosis.  The left kidney collecting system and left ureter demonstrate no evidence of stone or obstruction.    The urinary bladder is unremarkable.  The prostate is mildly enlarged with dystrophic calcifications.  There is a small right hydrocele and left inguinal hernia with fat content.  In the left femoral soft tissue, there is presence of multiple subcentimeter hyperdense material, which may represent surgical clips or dense calcifications.    There are no retroperitoneal masses.    The liver is mildly enlarged and demonstrates diffuse low attenuation, suggestive of steatosis.  There is an ill-defined focal area of hypoattenuation in the right hepatic lobe.  This may represent hepatic mass or area of focal fatty sparing.  Follow-up with dedicated CT or MRI is strongly advised.  There is also an another adjacent focus with demonstrate fluid attenuation favored to represent hepatic cyst.    The gallbladder is contracted and without calcified stones.  The spleen is unremarkable.  There is mild calcification of the splenic artery.  The adrenals and pancreas are unremarkable.    The stomach is unremarkable.  There are postsurgical changes at the distal small bowel with focal dilatation of the operative segment.  There is no dilatation of bowel proximal or distal to this segment and no bowel wall thickening.  There are scattered colonic diverticuli without evidence of diverticulitis.    No ascites or lymphadenopathy.    The visualized lung bases demonstrate mild dependent atelectasis.  The heart is normal in size.  There are coronary artery calcifications and mild calcific  atherosclerosis of the abdominal aorta.  No aortic aneurysms.    The osseous structures demonstrate age-appropriate degenerative changes.  Impression: 1. Ill-defined focal area of hypoattenuation in the right hepatic lobe, which may represent hepatic mass or area of focal fatty sparing.  Follow-up with dedicated CT (triple phase) or MRI of the liver is advised.  2. Hepatic steatosis and additional focus of fluid attenuation favored to represent hepatic cyst.  3. Non-obstructing stones in the bilateral kidneys.  4. Enlarged prostate with dystrophic calcifications.  5. Left inguinal hernia with fat content.  Small right hydrocele.  6. Postoperative changes involving the distal small bowel with focal dilatation of this segment of bowel.  This report was flagged in Epic as abnormal.    Electronically signed by resident: Sherine Sprague  Date:    10/07/2019  Time:    09:01    Electronically signed by: Junior Chandra MD  Date:    10/07/2019  Time:    09:50       Impression:  70-year-old gentleman with type 1 diabetes who underwent right hepatic lobectomy for cholangiocarcinoma also had a postop radiation chemo he did not tolerate chemo well he had issues with cytopenia and was hospitalized in September    He is feeling better now he has gained some weight.  He also had a bone marrow biopsy at that time.  He also developed ascites which was drained and according to him it was serous ascites.    All discussed this with his oncologist.  He needs to have an MRI done and possibly a PET scan to rule out any evidence of recurrence of the disease    Plan:       Will discuss with Dr. Casarez  May require MRI of the abdomen to study the liver  Continue nutritional supplementation  Continue to stop chemotherapy his cytopenia will gradually improve  Follow-up in 6 months          SMITH Tran MD, FACS   Associate Professor of Surgery, [facility]   Neuroendocrine Surgery, Hepatic/Pancreatic & General Surgery   [address] [place] [address]  [address]   [place] [address]   ph. [phone]; [phone]   fax. [phone]

## 2020-11-09 ENCOUNTER — OFFICE VISIT (OUTPATIENT)
Dept: NEUROLOGY | Facility: HOSPITAL | Age: 70
End: 2020-11-09
Attending: SURGERY
Payer: MEDICARE

## 2020-11-09 VITALS
DIASTOLIC BLOOD PRESSURE: 67 MMHG | BODY MASS INDEX: 25.18 KG/M2 | WEIGHT: 179.88 LBS | HEART RATE: 81 BPM | SYSTOLIC BLOOD PRESSURE: 129 MMHG | TEMPERATURE: 98 F | HEIGHT: 71 IN

## 2020-11-09 DIAGNOSIS — C22.1 CHOLANGIOCARCINOMA: Primary | ICD-10-CM

## 2020-11-09 PROCEDURE — 99213 OFFICE O/P EST LOW 20 MIN: CPT | Performed by: SURGERY

## 2020-11-09 RX ORDER — FUROSEMIDE 20 MG/1
TABLET ORAL
COMMUNITY
Start: 2020-10-15

## 2020-11-09 RX ORDER — PIMECROLIMUS 10 MG/G
1 CREAM TOPICAL NIGHTLY
COMMUNITY
Start: 2020-10-28

## 2020-11-09 RX ORDER — CLOPIDOGREL BISULFATE 75 MG/1
75 TABLET ORAL
COMMUNITY
Start: 2020-10-08

## 2020-11-09 RX ORDER — METOPROLOL SUCCINATE 50 MG/1
TABLET, EXTENDED RELEASE ORAL
COMMUNITY
Start: 2020-11-02

## 2021-02-27 ENCOUNTER — IMMUNIZATION (OUTPATIENT)
Dept: PHARMACY | Facility: CLINIC | Age: 71
End: 2021-02-27
Payer: MEDICARE

## 2021-02-27 DIAGNOSIS — Z23 NEED FOR VACCINATION: Primary | ICD-10-CM

## 2021-03-27 ENCOUNTER — IMMUNIZATION (OUTPATIENT)
Dept: PHARMACY | Facility: CLINIC | Age: 71
End: 2021-03-27
Payer: MEDICARE

## 2021-03-27 DIAGNOSIS — Z23 NEED FOR VACCINATION: Primary | ICD-10-CM

## 2021-07-29 ENCOUNTER — TELEPHONE (OUTPATIENT)
Dept: NEUROLOGY | Facility: HOSPITAL | Age: 71
End: 2021-07-29

## 2021-07-30 ENCOUNTER — TELEPHONE (OUTPATIENT)
Dept: NEUROLOGY | Facility: HOSPITAL | Age: 71
End: 2021-07-30

## 2021-08-09 ENCOUNTER — TELEPHONE (OUTPATIENT)
Dept: NEUROLOGY | Facility: HOSPITAL | Age: 71
End: 2021-08-09

## 2021-08-09 ENCOUNTER — OFFICE VISIT (OUTPATIENT)
Dept: NEUROLOGY | Facility: HOSPITAL | Age: 71
End: 2021-08-09
Attending: SURGERY
Payer: MEDICARE

## 2021-08-09 DIAGNOSIS — C22.1 CHOLANGIOCARCINOMA: Primary | ICD-10-CM

## 2021-08-24 ENCOUNTER — IMMUNIZATION (OUTPATIENT)
Dept: PRIMARY CARE CLINIC | Facility: CLINIC | Age: 71
End: 2021-08-24
Payer: MEDICARE

## 2021-08-24 DIAGNOSIS — Z23 NEED FOR VACCINATION: Primary | ICD-10-CM

## 2021-08-24 PROCEDURE — 91301 COVID-19, MRNA, LNP-S, PF, 100 MCG/0.5 ML DOSE VACCINE: CPT | Mod: PBBFAC | Performed by: INTERNAL MEDICINE

## 2021-08-24 PROCEDURE — 0013A COVID-19, MRNA, LNP-S, PF, 100 MCG/0.5 ML DOSE VACCINE: CPT | Mod: CV19,PBBFAC | Performed by: INTERNAL MEDICINE

## 2025-06-23 ENCOUNTER — TELEPHONE (OUTPATIENT)
Dept: HEMATOLOGY/ONCOLOGY | Facility: CLINIC | Age: 75
End: 2025-06-23
Payer: MEDICARE

## 2025-06-23 NOTE — TELEPHONE ENCOUNTER
Copied from CRM #[id number]. Topic: General Inquiry - Patient Advice  >> Jun 23, 2025  2:44 PM Lana wrote:  Type:  Needs Medical Advice    Who Called: pt    Would the patient rather a call back or a response via [facility]? Call     Best Call Back Number: [phone]     Additional Information: pt would like to continue care transferred from  with provider can she get all his test and records transferred

## 2025-07-10 ENCOUNTER — TELEPHONE (OUTPATIENT)
Dept: HEMATOLOGY/ONCOLOGY | Facility: CLINIC | Age: 75
End: 2025-07-10
Payer: MEDICARE

## 2025-07-10 NOTE — TELEPHONE ENCOUNTER
Copied from CRM #[id number]. Topic: Appointments - Appointment Access  >> Jul 10, 2025 10:14 AM Katya wrote:  Type:  Needs Appt    Who Called: Alphonse  Symptoms (please be specific): Low white blood cells     Would the patient rather a call back or a response via [facility]? Call  Best Call Back Number: [phone]  Additional Information: Low white blood cells. Patient was seeing Dr Mcadams at [facility]

## 2025-07-14 NOTE — PROGRESS NOTES
PATIENT: Parvez Rossi  MRN: [id number]  DATE: 7/15/2025    Scribe Attestation: I, Kalee Robles, am scribing under the direction and in the presence of Silvia Mcadams MD. I attest that this documentation is true, accurate and complete to the best of my knowledge.   Scribe Attestation: I, July Breonna, am scribing under the direction and in the presence of Silvia Mcadams MD. I attest that this documentation is true, accurate and complete to the best of my knowledge.      Diagnosis:   1. Cholangiocarcinoma    2. Myelodysplasia present in bone marrow    3. Chronic interstitial lung disease    4. Chronic congestive splenomegaly    5. Pancytopenia    6. Other specified counseling    7. H/O therapeutic radiation    8. Hepatic fibrosis      Chief Complaint: Cholangiocarcinoma     Oncologic History:   Oncology History    No history exists.     Subjective:    History of Present Illness: Mr. Rossi is a 74 y.o. male who presents for evaluation and management of intrahepatic cholangiocarcinoma and pancytopenia. On 2/14/20, he had a robot assisted right hepatectomy. Pathology showed pT1pNX adenocarcinoma. Tumor extended to the margin. Dr Ellis performed ablation around the surgical site. He was treated with adjuvant radiation along with xeloda. Prior to starting treatment, he had leukopenia. This was evaluated by Dr Foster in the past. During treatment, neutropenia has been a recurrent problem. Pt had COVID-19 in 1/2023. He has a PMH significant for DM which is well controlled.     Today patient came in for evaluation regarding f/up of colangiocarcinoma having been followed by me at a previous institution. The patient states that he had to visit urgent care while on vacation in [place] in 04/2025 because his right leg was warm and swollen. He notes that he was given a 10-day supply of antibiotics after being diagnosed with cellulitis in [facility]. His edema subsided. He now reports that he experienced the identical limb swelling  two weeks ago. He claims that after seeing his general practitioner, he was told that it wasn't cellulitis. He was given another 10-day course of antibiotics by his doctor, and his swelling subsided. He has no f ever, chills, rigors, or sweats.    Patient reports he feels normal and leg swelling has improved.    Patient mentions he had an appointment with dermatologist 07/14/2025 and needed to consist with hematology to start on a medication. This is unclear--will seeks records, info to help clarify this situation.    Patient endorses fatigue and mild SOB occasionally--the later w exertion--resolves w rest.    Patient follows with gastroenterologist Dr. Mayte danielleaptologist every 3 months for blood work and imaging and in my absence, has done caner imaging surveillance--recently negative.    Patient follows with Pulmonology. He recently established with an endocrinologist due to new diagnosis of DM.    No abnormal lumps, bumps, or abnormal masses palpated. Pt has no fever, chills, rigors, or night sweats. Appetite is good. Good energy levels. Weight stable. Pt denies headache, confusion, or Lhermitte symptomatology. No C/T/L spine or other back pain. Denies blurry vision. No changes in urinary habits. Pt has no cough or labored breathing. Denies palpitation, chest pain, anginal or pleuritic. Denies bleeding and blood clotting.    Past medical, surgical, family, and social histories have been reviewed and updated below.    Past Medical History:   Past Medical History:   Diagnosis Date    Cholangiocarcinoma     Coronary artery disease     Diabetes mellitus     Diabetes mellitus, type 2     Hyperlipidemia     Hypertension     Iron deficiency anemia, unspecified 12/08/2022    Liver cancer     Liver cancer (CMS/HCC)/liver mass    Past heart attack 09/2020    ST elevation (STEMI) myocardial infarction     Thrombocytopenia, unspecified      Past Surgical History:   Past Surgical History:   Procedure Laterality Date     "hepatectomy Right     HERNIA REPAIR      LIVER BIOPSY      PALATE SURGERY      for LISET    SMALL INTESTINE SURGERY      due to perforation, possible diverticulum? Believes 18 inches removed    TONSILLECTOMY       Family History: No family history on file.  Social History:  reports that he has never smoked. He has never used smokeless tobacco. He reports that he does not drink alcohol and does not use drugs.    Allergies:  Review of patient's allergies indicates:   Allergen Reactions    Atorvastatin      Other reaction(s): body aches    Statins-hmg-coa reductase inhibitors      Other reaction(s): body aches       Medications:  Current Medications[1]    Review of Systems  Comprehensive ROS is negative except for what was mentioned in HPI.     ECOG Performance Status:   ECOG SCORE           Objective:      Vitals:   Vitals:    07/15/25 1544   BP: (!) 144/70   Pulse: 94   Temp: 97 °F (36.1 °C)   SpO2: 97%   Weight: 77.8 kg (171 lb 8.3 oz)   Height: 5' 11" (1.803 m)     BMI: Body mass index is 23.92 kg/m².    Physical Exam  Vitals and nursing note reviewed.   Constitutional:       General: He is not in acute distress.     Appearance: He is not toxic-appearing or diaphoretic.      Comments: APPEARS ENERGETIC/NORMAL ENERGY   HENT:      Head: Normocephalic and atraumatic.      Right Ear: External ear normal.      Left Ear: External ear normal.      Nose: Nose normal. No congestion or rhinorrhea.      Right Nostril: No epistaxis.      Left Nostril: No epistaxis.      Mouth/Throat:      Mouth: Mucous membranes are moist.      Pharynx: Uvula midline. No oropharyngeal exudate or posterior oropharyngeal erythema.      Comments: NO HALITOSIS  Eyes:      General: Lids are normal. Vision grossly intact.      Extraocular Movements: Extraocular movements intact.      Right eye: No nystagmus.      Left eye: No nystagmus.      Conjunctiva/sclera: Conjunctivae normal.      Right eye: Right conjunctiva is not injected. No exudate.     " Left eye: Left conjunctiva is not injected. No exudate.     Pupils: Pupils are equal, round, and reactive to light.      Comments: No hyphema noted bilaterally.   Cardiovascular:      Rate and Rhythm: Normal rate and regular rhythm.      Pulses: Normal pulses.      Heart sounds: Normal heart sounds.   Pulmonary:      Effort: Pulmonary effort is normal. No respiratory distress.      Breath sounds: Normal breath sounds. No stridor. No wheezing, rhonchi or rales.      Comments: NO PLEURITIC CHEST PAIN  Chest:      Chest wall: No tenderness.   Abdominal:      General: Bowel sounds are normal. There is no distension.      Palpations: Abdomen is soft. There is splenomegaly. There is no fluid wave, hepatomegaly or mass.      Tenderness: There is no abdominal tenderness. There is no right CVA tenderness, left CVA tenderness, guarding or rebound.      Comments: Liver edge is down 6-7 cm. No nodularity detectable.  No caput medusae.   Musculoskeletal:         General: No swelling or tenderness. Normal range of motion.      Cervical back: Normal range of motion and neck supple. No rigidity.      Right lower leg: No edema.      Left lower leg: No edema.      Comments: NO CORDS PALPABLE, NEGATIVE HOMANS   Lymphadenopathy:      Head:      Right side of head: No submental, submandibular, tonsillar, preauricular, posterior auricular or occipital adenopathy.      Left side of head: No submental, submandibular, tonsillar, preauricular, posterior auricular or occipital adenopathy.      Cervical: No cervical adenopathy.      Right cervical: No superficial, deep or posterior cervical adenopathy.     Left cervical: No superficial, deep or posterior cervical adenopathy.      Upper Body:      Right upper body: No supraclavicular, axillary or epitrochlear adenopathy.      Left upper body: No supraclavicular, axillary or epitrochlear adenopathy.      Lower Body: No right inguinal adenopathy. No left inguinal adenopathy.      Comments: All  bibiana basins are nontender   Skin:     General: Skin is warm and dry.      Coloration: Skin is not ashen, cyanotic, jaundiced, mottled or pale.      Findings: No bruising, erythema or rash.      Comments: -NO PETECHIAE  -NO OBVIOUS LESIONS BUT THOROUGH EXAM NOT CARRIED OUT   Neurological:      General: No focal deficit present.      Mental Status: He is alert and oriented to person, place, and time.      Motor: No weakness.      Gait: Gait normal.      Deep Tendon Reflexes: Reflexes are normal and symmetric. Reflexes normal.      Reflex Scores:       Patellar reflexes are 2+ on the right side and 2+ on the left side.     Comments: NO CLONUS  ABSENT LHERMITTE'S   Psychiatric:         Mood and Affect: Mood normal.         Behavior: Behavior normal. Behavior is cooperative.         Judgment: Judgment normal.     Pt has situational anxiety, normal some chronic w chronic illness, liver disease, cancer--always craolynn well.  He presents independently to the exam as usual. Self reliant.    Laboratory Data:  Labs have been reviewed.    Lab Results   Component Value Date     WBC 1.5 (L) 03/10/2025     HGB 13.8 03/10/2025     HEMATOCRIT 41.3 03/10/2025     MCV 82.9 03/10/2025     MCH 27.6 03/10/2025     MCHC 33.3 03/10/2025     RDW 16.0 (H) 03/10/2025     PLT 66 (L) 03/10/2025     MPV 9.1 03/10/2025     DIFFTYPE MAN 01/24/2025               Lab Results   Component Value Date      03/10/2025     K 4.1 03/10/2025     CL 99 03/10/2025     CO2 32 03/10/2025      (H) 03/10/2025     BUN 20.0 03/10/2025     CREATININE 0.82 03/10/2025     CALCIUM 9.2 03/10/2025     PROT 7.7 03/10/2025     ALBUMIN 4.7 03/10/2025     BILIRUBIN 0.9 03/10/2025     AST 23 03/10/2025     ALKPHOS 171 (H) 03/10/2025     ALT 34 03/10/2025            Lab Results   Component Value Date     PROT 7.7 03/10/2025     ALBUMIN 4.7 03/10/2025     AST 23 03/10/2025     ALKPHOS 171 (H) 03/10/2025     ALT 34 03/10/2025     GGT 89 (H) 02/23/2024       Lab  "Results   Component Value Date    WBC 1.6 (L) 01/24/2025    RBC 4.97 09/17/2015    HGB 13.9 01/24/2025    HCT 41.8 01/24/2025    MCV 82.1 01/24/2025    MCH 27.3 01/24/2025    MCHC 33.3 01/24/2025    RDW 21.6 (H) 01/24/2025     09/17/2015    MPV 9.0 01/24/2025    GRAN 1.6 (L) 09/17/2015    GRAN 52.9 09/17/2015    LYMPH 0.9 (L) 09/17/2015    LYMPH 29.4 09/17/2015    MONO 0.4 09/17/2015    MONO 11.4 09/17/2015    EOS 0.1 09/17/2015    BASO 0.02 09/17/2015    EOSINOPHIL 4.6 09/17/2015    BASOPHIL 0.7 09/17/2015     No results found for: "UIBC", "IRON", "TRANS", "TRANSFERRIN", "TIBC", "LABIRON", "FESATURATED"   No results found for: "FERRITIN"  CMP  Sodium   Date Value Ref Range Status   05/20/2025 140 135 - 146 mmol/L Final   09/17/2015 136 136 - 145 mmol/L Final     Potassium   Date Value Ref Range Status   05/20/2025 3.6 3.6 - 5.2 mmol/L Final   09/17/2015 4.8 3.5 - 5.1 mmol/L Final     Chloride   Date Value Ref Range Status   09/17/2015 102 95 - 110 mmol/L Final     CO2   Date Value Ref Range Status   09/17/2015 23 23 - 29 mmol/L Final     Carbon Dioxide   Date Value Ref Range Status   05/20/2025 27 24 - 32 mmol/L Final     Glucose   Date Value Ref Range Status   09/17/2015 174 (H) 70 - 110 mg/dL Final     BUN   Date Value Ref Range Status   05/20/2025 20.0 7.0 - 25.0 mg/dL Final   09/17/2015 26 (H) 8 - 23 mg/dL Final     Creatinine   Date Value Ref Range Status   05/20/2025 0.68 (L) 0.70 - 1.40 mg/dL Final   09/17/2015 1.1 0.5 - 1.4 mg/dL Final     Calcium   Date Value Ref Range Status   05/20/2025 9.3 8.4 - 10.3 mg/dL Final   09/17/2015 10.2 8.7 - 10.5 mg/dL Final     Albumin   Date Value Ref Range Status   05/20/2025 3.9 3.4 - 5.0 g/dL Final     Total Bilirubin   Date Value Ref Range Status   05/20/2025 0.9 <1.3 mg/dL Final     AST   Date Value Ref Range Status   05/20/2025 31 <45 U/L Final     ALT   Date Value Ref Range Status   05/20/2025 46 (H) <46 U/L Final     Anion Gap   Date Value Ref Range Status "   05/20/2025 6 (L) 8 - 16 Final     Comment:     Calculation does not include K+   09/17/2015 11 8 - 16 mmol/L Final     eGFR   Date Value Ref Range Status   05/20/2025 98 >=90 mL/min/1.73m2 Final     Comment:     Calculation based on the Chronic Kidney Disease Epidemiology Collaboration (CKD-EPI) equation refit without adjustment for race.      on 05/20/2025  Component 05/20/2025 03/06/2025 02/27/2025 12/06/2024 07/19/2024 06/05/2024 02/23/2024             CA 19-9 264.6 High     399.0 High     416.4 High     436.2 High     457.1 High     487.8 High     424.4 High         Pathology:   Bone marrow biopsy 9/18/2024  Final Diagnosis  Bone marrow biopsy:  SLIGHTLY HYPERCELLULAR BONE MARROW WITH LEFT-SHIFTED GRANULOCYTES AND MEGALOBLASTIC ERYTHROID CHANGE.  MILD IMMUNOPHENOTYPIC ABNORMALITIES OF THE MYELOID CELLS BY FLOW CYTOMETRY.  LESS THAN 2% BLASTS BY CD34 IMMUNOHISTOCHEMICAL STAIN AND FLOW CYTOMETRY.  PANCYTOPENIA WITH HYPOCHROMIC, MICROCYTIC ANEMIA.  NEGATIVE MDS FISH PANEL.  CYTOGENETIC STUDIES AND LEUKOVANTAGE ARE PENDING AND WILL BE ISSUED IN AN ADDENDUM REPORT.  DECREASED IRON STORES BY IRON STAIN.  NO RING SIDEROBLASTS ARE IDENTIFIED.  NO EVIDENCE OF RETICULIN FIBROSIS BY RETICULIN STAIN.  NO EVIDENCE OF METASTATIC CARCINOMA OR GRANULOMATOUS INFLAMMATION.     Comment:  The patient has developed pancytopenia with hypochromic microcytic anemia.  Iron stores are decreased by iron stain and serum ferritin is low.  Iron deficiency appears to be a component of patient's pancytopenia.  However, an early evolving myelodysplastic syndrome can not be entirely excluded.  Cytogenetic studies and Leukovantage panel are pending and will be issued in an addendum.     Surgical pathology 7/2/2024  LIVER MASS, NEEDLE BIOPSY:               A.  NO EVIDENCE OF MALIGNANCY (SEE COMMENT).  B.  MILD PORTAL MIXED INFLAMMATION AND MILD PORTAL FIBROSIS WITH FOCAL BILE DUCTULAR         PROLIFERATION (SEE COMMENT).               B.   MILD MIXED MACRO- AND MICRO-VESICULAR STEATOSIS INVOLVING APPROXIMATELY 30% OF                      THE BIOPSIED LIVER PARENCHYMAL TISSUE.                 C.  FOCAL CENTRILOBULAR SINUSOIDAL DILATATION.  COMMENT:  No hepatocellular carcinoma or cholangiocarcinoma cells are identified in the sections examined.  The specimen represent benign liver parenchymal tissue with mild portal mixed inflammation and mild portal fibrosis with focal bile ductular proliferation.  These changes are non-specific.  The lobules show mild steatosis and occasional lobular inflammation.  The Trichrome stain shows mild fibrosis with no evidence of bridging fibrosis or cirrhosis.     Given the presence of liver mass lesion, clinical and radiological correlation is suggested to ensure tissue sampling adequacy.    Imaging:   MRI ABDOMEN W WO CONTRAST (9/11/2023)  No convincing hepatic malignancy status post right hepatectomy.     IMPRESSION:   1. Prior right-sided hepatectomy with postoperative change along the remaining posterior right side of the liver.     2. Unchanged geographic area of enhancement along the posterior inferior resection, most suggestive of transient hepatic attenuation difference and postoperative change, without focal finding to specifically suggest residual/recurrent disease.     3. No lymphadenopathy new/additional sites of disease.     4. Splenomegaly, and small paraesophageal/perigastric varices suggesting portal hypertension.     MRI ABDOMEN W WO CONTRAST (12/11/2023)  No convincing hepatic malignancy status post right hepatectomy.     MRI PELVIS W WO CONTRAST (12/11/2023)  Mild subcutis edema in the proximal right thigh and lateral right pelvic region. Shotty lymph nodes are noted in bilateral inguinal regions.     There is a borderline sized right proximal femoral lymph node which could be reactive in nature but close follow-up is recommended to exclude other etiologies.     PET CT Skull Base to Mid Thigh  (1/26/2024)  1.   6 mm weakly FDG avid right lower lobe pulmonary nodule and parenchymal density in the anterior medial right middle lobe adjacent to the pleural surface measuring approximately 1.7 cm in diameter with SUV max of 7.8. This could be infectious or inflammatory. Neoplastic process is not excluded and attention to follow-up on serial exams is advised.  2.   No evidence of metastatic disease to the abdomen or pelvis.  3.   Additional chronic findings outlined above.     CT Enterogram Abdomen and Pelvis with Contrast (1/26/2024)  Postop changes right hepatectomy again noted.  Suspicious 8 mm enhancing lesion anterior dome of the residual left liver lobe, medial segment, as described above.  There is a 2nd suspicious 7 mm focus of arterial enhancement inferior left liver lobe along the operative plane.  Consider surveillance MRI of the liver in approximately 3 months.  Liver density is decreased, suggesting steatosis.  Splenomegaly.  Worsening bronchiectasis and bronchial wall thickening with peripheral mucous plugging right lower lobe.  Interstitial scarring with nodularity medial segment right middle lobe.  Query aspiration history.  Bilateral inguinal lymphadenopathy.  No other pathologic lymphadenopathy visible.     CT Chest with Contrast (3/15/2024)  Circumferential thickening of the esophagus may reflect changes of esophagitis. Clinical correlation.  Interval development of multifocal areas of bronchial thickening, mucous plugging and nodular/reticular nodular changes of the right lung compatible with infectious/inflammatory etiology. Continued follow-up.  Other parenchymal nodular changes of the right lung appear decreased when compared to the most recent PET/CT scan examination. Follow-up is recommended.  Right hepatectomy changes. Posterior hepatic hypodensity adjacent to the hepatectomy region is somewhat decreased when compared to the previous examination.  Additional findings as noted above.      CT Abdomen Liver Triple Phase w wo Contrast 6/3/2024  1.   INTERVALLY ENLARGED 15 MM ENHANCING HEPATIC SEGMENT 4A OBSERVATION, PREVIOUSLY MEASURING 8 MM, PROBABLY HCC (LI-RADS LR 4).  2.   GROSSLY UNCHANGED 7 MM HEPATIC SEGMENT 3 OBSERVATION, INTERMEDIATE PROBABILITY OF MALIGNANCY (LI-RADS LR 3).  3.   UNCHANGED 1.8 X 1.8 CM LOW-ATTENUATION OBSERVATION WITHIN THE MEDIAL SEGMENT OF THE LEFT HEPATIC LOBE, ALONG THE SURGICAL MARGIN, LIKELY BENIGN (LI-RADS LR 2)  4.   WORSENED RIGHT LUNG CHANGES IN A PATTERN CONCERNING FOR AN INFLAMMATORY/INFECTIOUS BRONCHIOLITIS.  5.   ENLARGED SPLEEN WITH STIGMATA OF PORTAL HYPERTENSION.      PET CT Skull Base to Mid Thigh Restaging 6/21/2024  PRIOR RIGHT HEPATECTOMY.  NO EVIDENCE OF METASTATIC DISEASE OR RECURRENCE OF TUMOR.  ENHANCING LESIONS DESCRIBED ON THE COMPARISON TRIPLE PHASE CAT SCAN OF THE LIVER SHOW NO FDG UPTAKE DIFFERENT FROM BACKGROUND.  IMPROVEMENT OF HYPERMETABOLIC NODULE OF THE RIGHT MIDDLE LOBE.   WAXING AND WANING PULMONARY NODULES THROUGHOUT THE RIGHT LUNG, LIKELY ATYPICAL INFECTIOUS PROCESS, WHICH SHOWS NO HYPERMETABOLIC ACTIVITY.      MRI ABDOMEN & PELVIS W WO CONTRAST (08/07/2024)  IMPRESSION:   1.   Interval enlargement of the now 19 mm anterior hepatic dome observation with intense peripheral enhancement, previously measuring 15 mm (LI-RADS category LR4).   2.   New 16 mm posterior hepatic dome observation with persistent enhancement (LI-RADS category LR 3).  3.   New 9 mm posterior right hepatic lobe observation with persistent enhancement (LI-RADS category LR 3).  4.   New small right pleural effusion with worsening consolidative changes in the right lower lobe.  5.   Findings are concerning for multifocal recurrence.  6.   Additional chronic and unchanged findings as detailed in the body of the report.     CT Abdomen Liver Triple Phase w wo Contrast on 02/17/2025  Impression  1.   OBSERVATION 1 - TREATED HEPATIC SEGMENT 4A OBSERVATION WITH ASSOCIATED  PERSISTENT PERIPHERAL ENHANCEMENT INCLUDING COMPONENTS WITH A MORE NODULAR CONFIGURATION, ALTHOUGH WITHOUT DEFINITIVE WASHOUT. (7:34)   (LI-RADS LR TR EQUIVOCAL).  2.   OBSERVATION 2 -STABLE 2.0 X 1.9 CM LOW ATTENUATING, NONENHANCING HEPATIC OBSERVATION IDENTIFIED ALONG THE SURGICAL MARGIN OF THE PARTIALLY RESECTED RIGHT HEPATIC LOBE, LIKELY BENIGN (7:60) (LI-RADS, LR-2)  3.   OBSERVATIONS 3 -MORE CONSPICUOUS SCATTERED SUBCAPSULAR WEDGE-SHAPED ENHANCING OBSERVATIONS, LIKELY REFLECTING, IN LARGE PART, TRANSIENT HEPATIC ATTENUATION DIFFERENCES, LIKELY BENIGN (LI-RADS, LR-2)  4.   OBSERVATIONS 4-MORE CONSPICUOUS SUBCENTIMETER NODULAR ENHANCING OBSERVATIONS WITHIN HEPATIC SEGMENTS 2 AND 3 WITH INTERMEDIATE PROBABILITY OF MALIGNANCY (LI-RADS, LR-3)  5.   OBSERVATION 5- STABLE 0.9 X 0.5 CM HEPATIC SEGMENT 8 OBSERVATION, POTENTIALLY REFLECTING PRIMARILY VASCULAR STRUCTURES, LIKELY BENIGN (LI-RADS, LR-2) (7:49)  6.   THICKENED ASCENDING COLON AND HEPATIC FLEXURE WITH ASSOCIATED PERICOLONIC FAT STRANDING. RECOMMEND CORRELATION WITH PATIENT SYMPTOMATOLOGY AND CONSIDERATION OF COLONOSCOPY.    MRI Abdomen w wo Contrast on 03/13/2025  Impression  Allowing for differences in modality between the prior CT on 2/17/2025 and today's MRI, there is no significant interval detrimental change in the appearance of hepatic lesions. Treated tumor in segment 4A appears relatively unchanged allowing for differences in modality with some noted slight nodularity and progressive enhancement along the more central aspect of the lesion. Other previously described either small hyperenhancing lesions or perfusion anomalies in the liver appear relatively stable except for a previously described segment 4 lesion (previously characterized as a segment 2/3 lesion) which is not as evident on today's study. There is no overtly progressive lesion or new suspicious lesion.    Persistent splenomegaly.    CT Abdomen Liver Triple Phase w wo Contrast on  05/21/2025  Impression  Postoperative and post ablation/embolization changes within the liver without evidence of recurrent disease based on enhancement characteristics.  No evidence of new disease.    Continued interstitial lung disease primarily involving the right lung base.  Query chronic aspiration.    Continued severe splenomegaly.    Intact small bowel anastomosis right midabdomen.    US Lower Extremity Veins Right on 06/24/2025  Impression  1.  NO EVIDENCE FOR RIGHT LOWER EXTREMITY DVT.    Assessment:       1. Cholangiocarcinoma    2. Myelodysplasia present in bone marrow    3. Chronic interstitial lung disease    4. Chronic congestive splenomegaly    5. Pancytopenia    6. Other specified counseling    7. H/O therapeutic radiation    8. Hepatic fibrosis      Cholangiocarcinoma of liver pT1pNX intrahepatic cholangiocarcinoma s/p resection 2/2020  Diagnosed in 2020  Completed xeloda + radiation. Followed by additional 3 cycles of adjuvant xeloda alone. Planned treated was stopped early based on anemia, cardiac event.  PET DARWIN 6/24 AND CT ABDOMEN TRIPLE PHASE LATE 2024 QUESTIONABLE DISEA  --MRI abd completed on 3/13/25 showed no significant interval detrimental change in the appearance of hepatic lesions.  -I WILL ADD AFP--HEPATOCELLULAR CA?--add--returned nl  -ADDENDUM--3/13/25--NODULARITY NOTED NOT NEC CHANGED  -more recent triple phase ct and mri abd--no evidence of cancer hepatosplnomeaglay chronic noted    Chronic leukopenia/thrombocytopenia--the former a bit worse but still w ANC greater than 500--noted--the WBC is prob lower due to 20 days of antibitocs within the last month or so--will follow likely imporvemnt back to baseline ANC  --DUE TO CHRONIC LIVER DISEASE/SPLENOMELGALY AND MYELODYSPLASIA MILD IN HIS MARROW  --NO SIGN OF INFECTION TODAY OR BLEEDING     S/p Y90. followed by Dr. Hu  Biopsy liver w radiograph suggesting recurrence 7/2/2024 DARWIN but significant for steatosis and fibrosis.   - Patient  follows up with Dr. Hu every 3 months with labs and imaging.    Peripheral edema not marked--secondary to liver disease, some degree of protal hypertension likely--no complcaiton today to suggest DVT.     --elevatED CA 19-9--trending down--can be elevated other than by cancer     --PANCYTOPENIA -neutropenia with ANC stably over 500 without signs/sx of infection, plt adequate greater than 50, no anemia-multifactorial--mainly liver dx, very mild MDS notes, none severe, none req intervention today  - Most recent neutropenia likely due to recent antibiotic treatment. 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 Patient exhibits understanding of counseling given today.      Fatigue/insomnia chronic no exacTODAY- Recommended melatonin in the past (1mg at a time) titrate up previously, does ADLs and light chores     Situational anxiety nl for complicated medical dx- Pt continues with good personal insight. Coping well    STEMI- ar reatively young age previously--history continue medical management per cardiology (Dr Reis)  -HE CONTINUES BABY ASPIRIN AND IS INSTRUCTED TO ASK THE CARDIOLOGIST ABOUT THIS AS IT RELATES TO THIS PLATELET COUNT STABLY OVER 50     H/o blood in stool NO COMPLAINT  TODAY- Participating physicians (Dr. Everett, Dr. Hu) did not believe colonoscopy was warranted, per patient.     Chronic congestive splenomegaly:  - Related to his chronic liver disease    Hepatic fibrosis:  - Related to his chronic liver disease     Plan:     Today I counseled pt about the following:  TODAYS PLANS: FOLLOW UP, REFERRALS, TREATMENT DETAILS  Patient exhibits understanding of all counseling today.    RTC in 10 days with CBC    I spent a total of 65 minutes in a combination of focused physical exam and history, reviewing outside records, reviewing any available radiographs, counseling, communicating with other health care professionals regarding this patients medical condition, independently interpreting results, developing diagnostic and treatment plan and documenting in the EMR. Comm with other md--phone call    Participating Clinicians:  PCP-see story board  Dr. Tarah Hu, Gastroenterology  Dr. Kj Everett, Gastroenterology  Dr. Lee, Pulmonologist    Disclaimer: This note was prepared using voice recognition system and is likely to have sound alike errors.  Please interpret accordingly.    Silvia Mcadams M.D.  Hematology/Oncology  [facility] [address], [address]  [place] [address]  Phone: [phone]  Fax: [phone]         [1]   Current Outpatient Medications   Medication Sig Dispense Refill    ACCU-CHEK NERY PLUS TEST STRP Strp       amLODIPine (NORVASC) 2.5 MG tablet       aspirin (ECOTRIN) 81 MG EC tablet Take 81 mg by mouth.      bumetanide (BUMEX) 0.5 MG Tab       capecitabine (XELODA) 500 MG Tab       clopidogreL (PLAVIX) 75 mg tablet Take 75 mg by mouth.      empagliflozin (JARDIANCE ORAL) Take by mouth.      evolocumab (REPATHA SURECLICK) 140 mg/mL PnIj       fenofibrate (TRIGLIDE) 50 MG tablet Take 160 mg by mouth once daily.       fenofibrate 160 MG Tab       fish oil-omega-3 fatty acids 300-1,000 mg capsule Take 2 g by mouth once  daily.      furosemide (LASIX) 20 MG tablet TK 1 T PO QD PRN      ibuprofen (ADVIL,MOTRIN) 200 MG tablet Take 200 mg by mouth every 6 (six) hours as needed for Pain.      ICAR-C PLUS Tab       insulin asp prt-insulin aspart, NOVOLOG 70/30, (NOVOLOG 70/30) 100 unit/mL (70-30) Soln       insulin aspart (NOVOLOG) 100 unit/mL injection Inject 70 Units into the skin 3 (three) times daily before meals.      insulin glargine (LANTUS) 100 unit/mL injection Inject 50 Units into the skin every evening.      irbesartan (AVAPRO) 75 MG tablet Take 300 mg by mouth every evening.       LEVEMIR U-100 INSULIN 100 unit/mL injection       metformin (GLUCOPHAGE) 500 MG tablet Take 1,000 mg by mouth 4 (four) times daily.       metoprolol succinate (TOPROL-XL) 50 MG 24 hr tablet       omega 3-dha-epa-fish oil 1,000 mg (120 mg-180 mg) Cap Take 2 capsules by mouth.      ondansetron (ZOFRAN) 4 MG tablet       ondansetron (ZOFRAN-ODT) 4 MG TbDL Take 2 tablets (8 mg total) by mouth every 8 (eight) hours as needed. 10 tablet 1    oxycodone-acetaminophen (PERCOCET)  mg per tablet Take 1 tablet by mouth every 4 to 6 hours as needed. 50 tablet 0    pantoprazole (PROTONIX) 40 MG tablet       pimecrolimus (ELIDEL) 1 % cream 1 application every evening. Apply to face      pregabalin (LYRICA) 50 MG capsule       spironolactone (ALDACTONE) 100 MG tablet       tamsulosin (FLOMAX) 0.4 mg Cap       traMADol (ULTRAM) 50 mg tablet       ursodiol (ACTIGALL) 300 mg capsule        No current facility-administered medications for this visit.

## 2025-07-15 ENCOUNTER — OFFICE VISIT (OUTPATIENT)
Dept: HEMATOLOGY/ONCOLOGY | Facility: CLINIC | Age: 75
End: 2025-07-15
Payer: MEDICARE

## 2025-07-15 VITALS
HEART RATE: 94 BPM | TEMPERATURE: 97 F | DIASTOLIC BLOOD PRESSURE: 70 MMHG | HEIGHT: 71 IN | WEIGHT: 171.5 LBS | OXYGEN SATURATION: 97 % | SYSTOLIC BLOOD PRESSURE: 144 MMHG | BODY MASS INDEX: 24.01 KG/M2

## 2025-07-15 DIAGNOSIS — J84.9 CHRONIC INTERSTITIAL LUNG DISEASE: ICD-10-CM

## 2025-07-15 DIAGNOSIS — Z92.3 H/O THERAPEUTIC RADIATION: ICD-10-CM

## 2025-07-15 DIAGNOSIS — Z71.89 OTHER SPECIFIED COUNSELING: ICD-10-CM

## 2025-07-15 DIAGNOSIS — D46.Z MYELODYSPLASIA PRESENT IN BONE MARROW: ICD-10-CM

## 2025-07-15 DIAGNOSIS — D61.818 PANCYTOPENIA: ICD-10-CM

## 2025-07-15 DIAGNOSIS — C22.1 CHOLANGIOCARCINOMA: Primary | ICD-10-CM

## 2025-07-15 DIAGNOSIS — D73.2 CHRONIC CONGESTIVE SPLENOMEGALY: ICD-10-CM

## 2025-07-15 DIAGNOSIS — K74.00 HEPATIC FIBROSIS: ICD-10-CM

## 2025-07-15 PROCEDURE — 3288F FALL RISK ASSESSMENT DOCD: CPT | Mod: CPTII,S$GLB,, | Performed by: INTERNAL MEDICINE

## 2025-07-15 PROCEDURE — 3078F DIAST BP <80 MM HG: CPT | Mod: CPTII,S$GLB,, | Performed by: INTERNAL MEDICINE

## 2025-07-15 PROCEDURE — 99205 OFFICE O/P NEW HI 60 MIN: CPT | Mod: S$GLB,,, | Performed by: INTERNAL MEDICINE

## 2025-07-15 PROCEDURE — 1160F RVW MEDS BY RX/DR IN RCRD: CPT | Mod: CPTII,S$GLB,, | Performed by: INTERNAL MEDICINE

## 2025-07-15 PROCEDURE — 1101F PT FALLS ASSESS-DOCD LE1/YR: CPT | Mod: CPTII,S$GLB,, | Performed by: INTERNAL MEDICINE

## 2025-07-15 PROCEDURE — 3008F BODY MASS INDEX DOCD: CPT | Mod: CPTII,S$GLB,, | Performed by: INTERNAL MEDICINE

## 2025-07-15 PROCEDURE — 1126F AMNT PAIN NOTED NONE PRSNT: CPT | Mod: CPTII,S$GLB,, | Performed by: INTERNAL MEDICINE

## 2025-07-15 PROCEDURE — 99999 PR PBB SHADOW E&M-EST. PATIENT-LVL V: CPT | Mod: PBBFAC,,, | Performed by: INTERNAL MEDICINE

## 2025-07-15 PROCEDURE — 1159F MED LIST DOCD IN RCRD: CPT | Mod: CPTII,S$GLB,, | Performed by: INTERNAL MEDICINE

## 2025-07-15 PROCEDURE — 3077F SYST BP >= 140 MM HG: CPT | Mod: CPTII,S$GLB,, | Performed by: INTERNAL MEDICINE

## 2025-07-24 NOTE — PROGRESS NOTES
PATIENT: Parvez Rossi  MRN: [id number]  DATE: 8/4/2025    Diagnosis: below    Chief Complaint: Cholangiocarcinoma     Oncologic History:   Oncology History    No history exists.     Subjective:    History of Present Illness: Mr. Rossi is a 74 y.o. male who presents for evaluation and management of intrahepatic cholangiocarcinoma and pancytopenia. On 2/14/20, he had a robot assisted right hepatectomy. Pathology showed pT1pNX adenocarcinoma. Tumor extended to the margin. Dr Ellis performed ablation around the surgical site. He was treated with adjuvant radiation along with xeloda. Prior to starting treatment, he had leukopenia. This was evaluated by Dr Foster in the past. During treatment, neutropenia has been a recurrent problem. Pt had COVID-19 in 1/2023. He has a PMH significant for DM which is well controlled.     Today:      No abnormal lumps, bumps, or abnormal masses palpated. Pt has no fever, chills, rigors, or night sweats. Appetite is good. Good energy levels. Weight stable. Pt denies headache, confusion, or Lhermitte symptomatology. No C/T/L spine or other back pain. Denies blurry vision. No changes in urinary habits. Pt has no cough or labored breathing. Denies palpitation, chest pain, anginal or pleuritic. Denies bleeding and blood clotting SX-N OL SWELLING OR TENDERNESS OF UNUSUAL DEGREE IN HIS CALVES OR THIGHS--NONTENDER.     READ ON SPLENOMEGALY AND PLANE FLYING/PRESSURE--SEE BELOW.    Past medical, surgical, family, and social histories have been reviewed and updated below.    Past Medical History:   Past Medical History:   Diagnosis Date    Cholangiocarcinoma     Coronary artery disease     Diabetes mellitus     Diabetes mellitus, type 2     Hyperlipidemia     Hypertension     Iron deficiency anemia, unspecified 12/08/2022    Liver cancer     Liver cancer (CMS/HCC)/liver mass    Past heart attack 09/2020    ST elevation (STEMI) myocardial infarction     Thrombocytopenia, unspecified      Past  Surgical History:   Past Surgical History:   Procedure Laterality Date    hepatectomy Right     HERNIA REPAIR      LIVER BIOPSY      PALATE SURGERY      for LISET    SMALL INTESTINE SURGERY      due to perforation, possible diverticulum? Believes 18 inches removed    TONSILLECTOMY       Family History: No family history on file.  Social History:  reports that he has never smoked. He has never used smokeless tobacco. He reports that he does not drink alcohol and does not use drugs.    Allergies:  Review of patient's allergies indicates:   Allergen Reactions    Atorvastatin      Other reaction(s): body aches    Statins-hmg-coa reductase inhibitors      Other reaction(s): body aches       Medications:  Current Medications[1]    Review of Systems  Comprehensive ROS is negative except for what was mentioned in HPI.     ECOG Performance Status:   ECOG SCORE           Objective:      Vitals:   Vitals:    08/04/25 1443   BP: 125/60   Pulse: 72   Temp: 97.4 °F (36.3 °C)   SpO2: 97%   Weight: 78.1 kg (172 lb 2.9 oz)       BMI: Body mass index is 24.01 kg/m².    Physical Exam  Vitals and nursing note reviewed.   Constitutional:       General: He is not in acute distress.     Appearance: He is not toxic-appearing or diaphoretic.      Comments: APPEARS ENERGETIC/NORMAL ENERGY   HENT:      Head: Normocephalic and atraumatic.      Right Ear: External ear normal.      Left Ear: External ear normal.      Nose: Nose normal. No congestion or rhinorrhea.      Right Nostril: No epistaxis.      Left Nostril: No epistaxis.      Mouth/Throat:      Mouth: Mucous membranes are moist.      Pharynx: Uvula midline. No oropharyngeal exudate or posterior oropharyngeal erythema.      Comments: NO HALITOSIS  Eyes:      General: Lids are normal. Vision grossly intact.      Extraocular Movements: Extraocular movements intact.      Right eye: No nystagmus.      Left eye: No nystagmus.      Conjunctiva/sclera: Conjunctivae normal.      Right eye: Right  conjunctiva is not injected. No exudate.     Left eye: Left conjunctiva is not injected. No exudate.     Pupils: Pupils are equal, round, and reactive to light.      Comments: No hyphema noted bilaterally.   Cardiovascular:      Rate and Rhythm: Normal rate and regular rhythm.      Pulses: Normal pulses.      Heart sounds: Normal heart sounds.   Pulmonary:      Effort: Pulmonary effort is normal. No respiratory distress.      Breath sounds: Normal breath sounds. No stridor. No wheezing, rhonchi or rales.      Comments: NO PLEURITIC CHEST PAIN  Chest:      Chest wall: No tenderness.   Abdominal:      General: Bowel sounds are normal. There is no distension.      Palpations: Abdomen is soft. There is splenomegaly. There is no fluid wave, hepatomegaly or mass.      Tenderness: There is no abdominal tenderness. There is no right CVA tenderness, left CVA tenderness, guarding or rebound.      Comments: Liver edge is down 6-7 cm. No nodularity detectable.  No caput medusae.   Musculoskeletal:         General: No swelling or tenderness. Normal range of motion.      Cervical back: Normal range of motion and neck supple. No rigidity.      Right lower leg: No edema.      Left lower leg: No edema.      Comments: NO CORDS PALPABLE, NEGATIVE HOMANS   Lymphadenopathy:      Head:      Right side of head: No submental, submandibular, tonsillar, preauricular, posterior auricular or occipital adenopathy.      Left side of head: No submental, submandibular, tonsillar, preauricular, posterior auricular or occipital adenopathy.      Cervical: No cervical adenopathy.      Right cervical: No superficial, deep or posterior cervical adenopathy.     Left cervical: No superficial, deep or posterior cervical adenopathy.      Upper Body:      Right upper body: No supraclavicular, axillary or epitrochlear adenopathy.      Left upper body: No supraclavicular, axillary or epitrochlear adenopathy.      Lower Body: No right inguinal adenopathy. No  left inguinal adenopathy.      Comments: All bibiana basins are nontender   Skin:     General: Skin is warm and dry.      Coloration: Skin is not ashen, cyanotic, jaundiced, mottled or pale.      Findings: No bruising, erythema or rash.      Comments: -NO PETECHIAE  -NO OBVIOUS LESIONS BUT THOROUGH EXAM NOT CARRIED OUT   Neurological:      General: No focal deficit present.      Mental Status: He is alert and oriented to person, place, and time.      Motor: No weakness.      Gait: Gait normal.      Deep Tendon Reflexes: Reflexes are normal and symmetric. Reflexes normal.      Reflex Scores:       Patellar reflexes are 2+ on the right side and 2+ on the left side.     Comments: NO CLONUS  ABSENT LHERMITTE'S   Psychiatric:         Mood and Affect: Mood normal.         Behavior: Behavior normal. Behavior is cooperative.         Judgment: Judgment normal.     Pt has situational anxiety, normal some chronic w chronic illness, liver disease, cancer--always carolynn well.  He presents independently to the exam as usual. Self reliant. EDUCATION RELIEVES FEARS=-GOOD INTELLECT AND INSIGHT.    Laboratory Data:  Labs have been reviewed.    Lab Results   Component Value Date     WBC 1.5 (L) 03/10/2025     HGB 13.8 03/10/2025     HEMATOCRIT 41.3 03/10/2025     MCV 82.9 03/10/2025     MCH 27.6 03/10/2025     MCHC 33.3 03/10/2025     RDW 16.0 (H) 03/10/2025     PLT 66 (L) 03/10/2025     MPV 9.1 03/10/2025     DIFFTYPE MAN 01/24/2025               Lab Results   Component Value Date      03/10/2025     K 4.1 03/10/2025     CL 99 03/10/2025     CO2 32 03/10/2025      (H) 03/10/2025     BUN 20.0 03/10/2025     CREATININE 0.82 03/10/2025     CALCIUM 9.2 03/10/2025     PROT 7.7 03/10/2025     ALBUMIN 4.7 03/10/2025     BILIRUBIN 0.9 03/10/2025     AST 23 03/10/2025     ALKPHOS 171 (H) 03/10/2025     ALT 34 03/10/2025            Lab Results   Component Value Date     PROT 7.7 03/10/2025     ALBUMIN 4.7 03/10/2025     AST 23  "03/10/2025     ALKPHOS 171 (H) 03/10/2025     ALT 34 03/10/2025     GGT 89 (H) 02/23/2024       Lab Results   Component Value Date    WBC 1.89 (LL) 07/28/2025    RBC 4.94 07/28/2025    HGB 13.3 (L) 07/28/2025    HCT 42.3 07/28/2025    MCV 86 07/28/2025    MCH 26.9 (L) 07/28/2025    MCHC 31.4 (L) 07/28/2025    RDW 15.5 (H) 07/28/2025    PLT 53 (L) 07/28/2025    MPV 11.9 07/28/2025    GRAN 0.8 07/28/2025    LYMPH 20.0 07/28/2025    MONO 30.0 (H) 07/28/2025    EOS 0.1 09/17/2015    BASO 0.02 09/17/2015    EOSINOPHIL 7.0 07/28/2025    BASOPHIL 1.0 07/28/2025     No results found for: "UIBC", "IRON", "TRANS", "TRANSFERRIN", "TIBC", "LABIRON", "FESATURATED"   No results found for: "FERRITIN"  CMP  Sodium   Date Value Ref Range Status   05/20/2025 140 135 - 146 mmol/L Final   09/17/2015 136 136 - 145 mmol/L Final     Potassium   Date Value Ref Range Status   05/20/2025 3.6 3.6 - 5.2 mmol/L Final   09/17/2015 4.8 3.5 - 5.1 mmol/L Final     Chloride   Date Value Ref Range Status   09/17/2015 102 95 - 110 mmol/L Final     CO2   Date Value Ref Range Status   09/17/2015 23 23 - 29 mmol/L Final     Carbon Dioxide   Date Value Ref Range Status   05/20/2025 27 24 - 32 mmol/L Final     Glucose   Date Value Ref Range Status   09/17/2015 174 (H) 70 - 110 mg/dL Final     BUN   Date Value Ref Range Status   05/20/2025 20.0 7.0 - 25.0 mg/dL Final   09/17/2015 26 (H) 8 - 23 mg/dL Final     Creatinine   Date Value Ref Range Status   05/20/2025 0.68 (L) 0.70 - 1.40 mg/dL Final   09/17/2015 1.1 0.5 - 1.4 mg/dL Final     Calcium   Date Value Ref Range Status   05/20/2025 9.3 8.4 - 10.3 mg/dL Final   09/17/2015 10.2 8.7 - 10.5 mg/dL Final     Albumin   Date Value Ref Range Status   05/20/2025 3.9 3.4 - 5.0 g/dL Final     Total Bilirubin   Date Value Ref Range Status   05/20/2025 0.9 <1.3 mg/dL Final     AST   Date Value Ref Range Status   05/20/2025 31 <45 U/L Final     ALT   Date Value Ref Range Status   05/20/2025 46 (H) <46 U/L Final "     Anion Gap   Date Value Ref Range Status   05/20/2025 6 (L) 8 - 16 Final     Comment:     Calculation does not include K+   09/17/2015 11 8 - 16 mmol/L Final     eGFR   Date Value Ref Range Status   05/20/2025 98 >=90 mL/min/1.73m2 Final     Comment:     Calculation based on the Chronic Kidney Disease Epidemiology Collaboration (CKD-EPI) equation refit without adjustment for race.      on 05/20/2025  Component 05/20/2025 03/06/2025 02/27/2025 12/06/2024 07/19/2024 06/05/2024 02/23/2024             CA 19-9 264.6 High     399.0 High     416.4 High     436.2 High     457.1 High     487.8 High     424.4 High         Pathology:   Bone marrow biopsy 9/18/2024  Final Diagnosis  Bone marrow biopsy:  SLIGHTLY HYPERCELLULAR BONE MARROW WITH LEFT-SHIFTED GRANULOCYTES AND MEGALOBLASTIC ERYTHROID CHANGE.  MILD IMMUNOPHENOTYPIC ABNORMALITIES OF THE MYELOID CELLS BY FLOW CYTOMETRY.  LESS THAN 2% BLASTS BY CD34 IMMUNOHISTOCHEMICAL STAIN AND FLOW CYTOMETRY.  PANCYTOPENIA WITH HYPOCHROMIC, MICROCYTIC ANEMIA.  NEGATIVE MDS FISH PANEL.  CYTOGENETIC STUDIES AND LEUKOVANTAGE ARE PENDING AND WILL BE ISSUED IN AN ADDENDUM REPORT.  DECREASED IRON STORES BY IRON STAIN.  NO RING SIDEROBLASTS ARE IDENTIFIED.  NO EVIDENCE OF RETICULIN FIBROSIS BY RETICULIN STAIN.  NO EVIDENCE OF METASTATIC CARCINOMA OR GRANULOMATOUS INFLAMMATION.     Comment:  The patient has developed pancytopenia with hypochromic microcytic anemia.  Iron stores are decreased by iron stain and serum ferritin is low.  Iron deficiency appears to be a component of patient's pancytopenia.  However, an early evolving myelodysplastic syndrome can not be entirely excluded.  Cytogenetic studies and Leukovantage panel are pending and will be issued in an addendum.     Surgical pathology 7/2/2024  LIVER MASS, NEEDLE BIOPSY:               A.  NO EVIDENCE OF MALIGNANCY (SEE COMMENT).  B.  MILD PORTAL MIXED INFLAMMATION AND MILD PORTAL FIBROSIS WITH FOCAL BILE DUCTULAR          PROLIFERATION (SEE COMMENT).               B.  MILD MIXED MACRO- AND MICRO-VESICULAR STEATOSIS INVOLVING APPROXIMATELY 30% OF                      THE BIOPSIED LIVER PARENCHYMAL TISSUE.                 C.  FOCAL CENTRILOBULAR SINUSOIDAL DILATATION.  COMMENT:  No hepatocellular carcinoma or cholangiocarcinoma cells are identified in the sections examined.  The specimen represent benign liver parenchymal tissue with mild portal mixed inflammation and mild portal fibrosis with focal bile ductular proliferation.  These changes are non-specific.  The lobules show mild steatosis and occasional lobular inflammation.  The Trichrome stain shows mild fibrosis with no evidence of bridging fibrosis or cirrhosis.     Given the presence of liver mass lesion, clinical and radiological correlation is suggested to ensure tissue sampling adequacy.    Imaging:   MRI ABDOMEN W WO CONTRAST (9/11/2023)  No convincing hepatic malignancy status post right hepatectomy.     IMPRESSION:   1. Prior right-sided hepatectomy with postoperative change along the remaining posterior right side of the liver.     2. Unchanged geographic area of enhancement along the posterior inferior resection, most suggestive of transient hepatic attenuation difference and postoperative change, without focal finding to specifically suggest residual/recurrent disease.     3. No lymphadenopathy new/additional sites of disease.     4. Splenomegaly, and small paraesophageal/perigastric varices suggesting portal hypertension.     MRI ABDOMEN W WO CONTRAST (12/11/2023)  No convincing hepatic malignancy status post right hepatectomy.     MRI PELVIS W WO CONTRAST (12/11/2023)  Mild subcutis edema in the proximal right thigh and lateral right pelvic region. Shotty lymph nodes are noted in bilateral inguinal regions.     There is a borderline sized right proximal femoral lymph node which could be reactive in nature but close follow-up is recommended to exclude other  etiologies.     PET CT Skull Base to Mid Thigh (1/26/2024)  1.   6 mm weakly FDG avid right lower lobe pulmonary nodule and parenchymal density in the anterior medial right middle lobe adjacent to the pleural surface measuring approximately 1.7 cm in diameter with SUV max of 7.8. This could be infectious or inflammatory. Neoplastic process is not excluded and attention to follow-up on serial exams is advised.  2.   No evidence of metastatic disease to the abdomen or pelvis.  3.   Additional chronic findings outlined above.     CT Enterogram Abdomen and Pelvis with Contrast (1/26/2024)  Postop changes right hepatectomy again noted.  Suspicious 8 mm enhancing lesion anterior dome of the residual left liver lobe, medial segment, as described above.  There is a 2nd suspicious 7 mm focus of arterial enhancement inferior left liver lobe along the operative plane.  Consider surveillance MRI of the liver in approximately 3 months.  Liver density is decreased, suggesting steatosis.  Splenomegaly.  Worsening bronchiectasis and bronchial wall thickening with peripheral mucous plugging right lower lobe.  Interstitial scarring with nodularity medial segment right middle lobe.  Query aspiration history.  Bilateral inguinal lymphadenopathy.  No other pathologic lymphadenopathy visible.     CT Chest with Contrast (3/15/2024)  Circumferential thickening of the esophagus may reflect changes of esophagitis. Clinical correlation.  Interval development of multifocal areas of bronchial thickening, mucous plugging and nodular/reticular nodular changes of the right lung compatible with infectious/inflammatory etiology. Continued follow-up.  Other parenchymal nodular changes of the right lung appear decreased when compared to the most recent PET/CT scan examination. Follow-up is recommended.  Right hepatectomy changes. Posterior hepatic hypodensity adjacent to the hepatectomy region is somewhat decreased when compared to the previous  examination.  Additional findings as noted above.     CT Abdomen Liver Triple Phase w wo Contrast 6/3/2024  1.   INTERVALLY ENLARGED 15 MM ENHANCING HEPATIC SEGMENT 4A OBSERVATION, PREVIOUSLY MEASURING 8 MM, PROBABLY HCC (LI-RADS LR 4).  2.   GROSSLY UNCHANGED 7 MM HEPATIC SEGMENT 3 OBSERVATION, INTERMEDIATE PROBABILITY OF MALIGNANCY (LI-RADS LR 3).  3.   UNCHANGED 1.8 X 1.8 CM LOW-ATTENUATION OBSERVATION WITHIN THE MEDIAL SEGMENT OF THE LEFT HEPATIC LOBE, ALONG THE SURGICAL MARGIN, LIKELY BENIGN (LI-RADS LR 2)  4.   WORSENED RIGHT LUNG CHANGES IN A PATTERN CONCERNING FOR AN INFLAMMATORY/INFECTIOUS BRONCHIOLITIS.  5.   ENLARGED SPLEEN WITH STIGMATA OF PORTAL HYPERTENSION.      PET CT Skull Base to Mid Thigh Restaging 6/21/2024  PRIOR RIGHT HEPATECTOMY.  NO EVIDENCE OF METASTATIC DISEASE OR RECURRENCE OF TUMOR.  ENHANCING LESIONS DESCRIBED ON THE COMPARISON TRIPLE PHASE CAT SCAN OF THE LIVER SHOW NO FDG UPTAKE DIFFERENT FROM BACKGROUND.  IMPROVEMENT OF HYPERMETABOLIC NODULE OF THE RIGHT MIDDLE LOBE.   WAXING AND WANING PULMONARY NODULES THROUGHOUT THE RIGHT LUNG, LIKELY ATYPICAL INFECTIOUS PROCESS, WHICH SHOWS NO HYPERMETABOLIC ACTIVITY.      MRI ABDOMEN & PELVIS W WO CONTRAST (08/07/2024)  IMPRESSION:   1.   Interval enlargement of the now 19 mm anterior hepatic dome observation with intense peripheral enhancement, previously measuring 15 mm (LI-RADS category LR4).   2.   New 16 mm posterior hepatic dome observation with persistent enhancement (LI-RADS category LR 3).  3.   New 9 mm posterior right hepatic lobe observation with persistent enhancement (LI-RADS category LR 3).  4.   New small right pleural effusion with worsening consolidative changes in the right lower lobe.  5.   Findings are concerning for multifocal recurrence.  6.   Additional chronic and unchanged findings as detailed in the body of the report.     CT Abdomen Liver Triple Phase w wo Contrast on 02/17/2025  Impression  1.   OBSERVATION 1 -  TREATED HEPATIC SEGMENT 4A OBSERVATION WITH ASSOCIATED PERSISTENT PERIPHERAL ENHANCEMENT INCLUDING COMPONENTS WITH A MORE NODULAR CONFIGURATION, ALTHOUGH WITHOUT DEFINITIVE WASHOUT. (7:34)   (LI-RADS LR TR EQUIVOCAL).  2.   OBSERVATION 2 -STABLE 2.0 X 1.9 CM LOW ATTENUATING, NONENHANCING HEPATIC OBSERVATION IDENTIFIED ALONG THE SURGICAL MARGIN OF THE PARTIALLY RESECTED RIGHT HEPATIC LOBE, LIKELY BENIGN (7:60) (LI-RADS, LR-2)  3.   OBSERVATIONS 3 -MORE CONSPICUOUS SCATTERED SUBCAPSULAR WEDGE-SHAPED ENHANCING OBSERVATIONS, LIKELY REFLECTING, IN LARGE PART, TRANSIENT HEPATIC ATTENUATION DIFFERENCES, LIKELY BENIGN (LI-RADS, LR-2)  4.   OBSERVATIONS 4-MORE CONSPICUOUS SUBCENTIMETER NODULAR ENHANCING OBSERVATIONS WITHIN HEPATIC SEGMENTS 2 AND 3 WITH INTERMEDIATE PROBABILITY OF MALIGNANCY (LI-RADS, LR-3)  5.   OBSERVATION 5- STABLE 0.9 X 0.5 CM HEPATIC SEGMENT 8 OBSERVATION, POTENTIALLY REFLECTING PRIMARILY VASCULAR STRUCTURES, LIKELY BENIGN (LI-RADS, LR-2) (7:49)  6.   THICKENED ASCENDING COLON AND HEPATIC FLEXURE WITH ASSOCIATED PERICOLONIC FAT STRANDING. RECOMMEND CORRELATION WITH PATIENT SYMPTOMATOLOGY AND CONSIDERATION OF COLONOSCOPY.    MRI Abdomen w wo Contrast on 03/13/2025  Impression  Allowing for differences in modality between the prior CT on 2/17/2025 and today's MRI, there is no significant interval detrimental change in the appearance of hepatic lesions. Treated tumor in segment 4A appears relatively unchanged allowing for differences in modality with some noted slight nodularity and progressive enhancement along the more central aspect of the lesion. Other previously described either small hyperenhancing lesions or perfusion anomalies in the liver appear relatively stable except for a previously described segment 4 lesion (previously characterized as a segment 2/3 lesion) which is not as evident on today's study. There is no overtly progressive lesion or new suspicious lesion.    Persistent  splenomegaly.    CT Abdomen Liver Triple Phase w wo Contrast on 05/21/2025  Impression  Postoperative and post ablation/embolization changes within the liver without evidence of recurrent disease based on enhancement characteristics.  No evidence of new disease.    Continued interstitial lung disease primarily involving the right lung base.  Query chronic aspiration.    Continued severe splenomegaly.    Intact small bowel anastomosis right midabdomen.    US Lower Extremity Veins Right on 06/24/2025  Impression  1.  NO EVIDENCE FOR RIGHT LOWER EXTREMITY DVT.    Assessment:       1. History of cholangiocarcinoma    2. Elevated CA 19-9 level    3. Other specified counseling    4. Splenomegaly, congestive, chronic    5. Chronic liver disease    6. Pancytopenia        Cholangiocarcinoma of liver pT1pNX intrahepatic cholangiocarcinoma s/p resection 2/2020  Diagnosed in 2020  Completed xeloda + radiation. Followed by additional 3 cycles of adjuvant xeloda alone. Planned treated was stopped early based on anemia, cardiac event.  PET DARWIN 6/24 AND CT ABDOMEN TRIPLE PHASE LATE 2024 QUESTIONABLE DISEA  --MRI abd completed on 3/13/25 showed no significant interval detrimental change in the appearance of hepatic lesions.  -I WILL ADD AFP--HEPATOCELLULAR CA?--add--returned nl last visit  -ADDENDUM--3/13/25--NODULARITY NOTED NOT NEC CHANGED  -more recent triple phase ct and mri abd by DR MORTON-no evidence of cancer hepatosplnomeaglay chronic note  DR MORTON IS TO PRESENT PT AT [facility]FER[facility] AND PT NOTES WSHE WILL CALL HIM ABOUT IMPRESSIONS    Chronic leukopenia/thrombocytopenia--the former a bit worse but still w ANC greater than 500--noted--the WBC is prob lower due to 20 days of antibitocs within the last month or so--will follow likely imporvemnt back to baseline ANC--one week ago 800--adEquate/improved  -2 EPISODES OF UNILATERAL SAME LEG CELLULITIS A FEW WEEKS APART DO WARRANT GCSF USE IN MY OPINION--DETAILED CONV W PT ABOUT  INDICATIONS, INCONVENIENT FREQ DOSING REQUIRED, EXPENSE FOR LITTLE BENEFIT AT THIS REVIEWED AGAIN TODAY  --DUE TO CHRONIC LIVER DISEASE/SPLENOMELGALY AND MYELODYSPLASIA MILD IN HIS MARROW  --NO SIGN OF INFECTION TODAY OR BLEEDING    SPLENOMEGALY--RESEARCH FLIGHT RISK FOR PT--SHOULD STAY MOBILE--AVOID TRAUMA ON PLANES, BUT LITERATURE DOES NOT SUGGEST AVOIDING FLYING BECAUSE OF THIS--FREQ MOBILITY ON PLANES REC.    PERIPHERAL VASCULAAR DISEASE-INC RISK OF CELLUTLITS--SEE SX/DISC ABOVE--EDUCATION ABOUT PORTAL HYPERTENSION ETC--TO HELP PT UNDERSTAND HIS COMORBIDITIES AND INTERACTION THEREOF     S/p Y90. followed by Dr. Morton  Biopsy liver w radiograph suggesting recurrence 7/2/2024 DARWIN but significant for steatosis and fibrosis.   - Patient follows up with Dr. Morton every 3 months with labs and imaging.    ELEVATED  WITHOUT EVIDENCE OF CANCER AND IT IS DECLINING--THE NATURE OF TUMOR MARKERS NOT BEING DIAGNOSTIC DISCUSSED IN GREAT DETALIL--DR MORTON ORDERS THIS REGULARLY    IRON DEFICIENCY anemia-WHILE CORRECTED, NO ANEMIA  -FOLLOWED BY GASTROENTEROLOGY   -NO SIGNS OR SYMPTOMS OF BLEEDING TODAY    Peripheral edema not marked--secondary to liver disease, some degree of protal hypertension likely--no complcaiton today to suggest DVT.PRETIBIAL HYPERPIMENTAITON NOTED CHRONICALLY.     --elevatED CA 19-9--trending down--can be elevated other than by cancer     --PANCYTOPENIA -neutropenia with ANC stably over 500 without signs/sx of infection, plt adequate greater than 50, no anemia-multifactorial--mainly liver dx, very mild MDS notes, none severe, none req intervention today  - Most recent neutropenia likely due to recent antibiotic treatment. 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 Patient exhibits understanding of counseling.     Fatigue/insomnia chronic no exacTODAY- Recommended melatonin in the past (1mg at a time) titrate up previously, does ADLs and light chores     Situational anxiety nl for complicated medical dx- Pt continues with good personal insight. Coping well    STEMI- ar reatively young age previously--history continue medical management per cardiology (Dr Reis)  -HE CONTINUES BABY ASPIRIN AND IS INSTRUCTED TO ASK THE CARDIOLOGIST ABOUT THIS AS IT RELATES TO THIS PLATELET COUNT STABLY OVER 50     H/o blood in stool NO COMPLAINT TODAY- Participating physicians (Dr. Everett, Dr. Morton) did not believe colonoscopy was warranted, per patient.     Chronic congestive splenomegaly:  - Related to his chronic liver disease    Hepatic fibrosis:  - Related to his chronic liver disease     Plan:   Today I counseled pt about the following:  TODAYS PLANS: FOLLOW UP, REFERRALS, TREATMENT DETAILS, SURVIVORSHIP, and PSYCHOSOCIAL   Patient exhibits understanding of all counseling today.    RTC 4 MO W CBC CMP--DR MORTON IS TO GET LAB IN 4-6 WEEKS AND GETS CA 19-9    I spent a total of 30 minutes in a combination of focused physical exam and history, reviewing outside records, reviewing any available radiographs, counseling, communicating with other health care professionals regarding this patients medical condition, independently interpreting results, developing diagnostic and treatment plan and documenting in the EMR. LEITARTURE RESEARCH, COUNSELING ON MANY FEARS/LAB-SEE ABOVE    Participating Clinicians:  PCP-see  story board  Dr. Tarah Hu, Gastroenterology  Dr. Kj Everett, Gastroenterology  Dr. Lee, Pulmonologist    Disclaimer: This note was prepared using voice recognition system and is likely to have sound alike errors.  Please interpret accordingly.    Silvia Mcadams M.D.  Hematology/Oncology  [facility] [address], [address]  [place] [address]  Phone: [phone]  Fax: [phone]           [1]   Current Outpatient Medications   Medication Sig Dispense Refill    ACCU-CHEK NERY PLUS TEST STRP Strp       amLODIPine (NORVASC) 2.5 MG tablet       aspirin (ECOTRIN) 81 MG EC tablet Take 81 mg by mouth.      bumetanide (BUMEX) 0.5 MG Tab       capecitabine (XELODA) 500 MG Tab       clopidogreL (PLAVIX) 75 mg tablet Take 75 mg by mouth.      empagliflozin (JARDIANCE ORAL) Take by mouth.      evolocumab (REPATHA SURECLICK) 140 mg/mL PnIj       fenofibrate (TRIGLIDE) 50 MG tablet Take 160 mg by mouth once daily.       fenofibrate 160 MG Tab       fish oil-omega-3 fatty acids 300-1,000 mg capsule Take 2 g by mouth once daily.      furosemide (LASIX) 20 MG tablet TK 1 T PO QD PRN      ibuprofen (ADVIL,MOTRIN) 200 MG tablet Take 200 mg by mouth every 6 (six) hours as needed for Pain.      ICAR-C PLUS Tab       insulin asp prt-insulin aspart, NOVOLOG 70/30, (NOVOLOG 70/30) 100 unit/mL (70-30) Soln       insulin aspart (NOVOLOG) 100 unit/mL injection Inject 70 Units into the skin 3 (three) times daily before meals.      insulin glargine (LANTUS) 100 unit/mL injection Inject 50 Units into the skin every evening.      irbesartan (AVAPRO) 75 MG tablet Take 300 mg by mouth every evening.       LEVEMIR U-100 INSULIN 100 unit/mL injection       metformin (GLUCOPHAGE) 500 MG tablet Take 1,000 mg by mouth 4 (four) times daily.       metoprolol succinate (TOPROL-XL) 50 MG 24 hr tablet       omega 3-dha-epa-fish oil 1,000 mg (120 mg-180 mg) Cap Take 2 capsules by mouth.      ondansetron  (ZOFRAN) 4 MG tablet       ondansetron (ZOFRAN-ODT) 4 MG TbDL Take 2 tablets (8 mg total) by mouth every 8 (eight) hours as needed. 10 tablet 1    oxycodone-acetaminophen (PERCOCET)  mg per tablet Take 1 tablet by mouth every 4 to 6 hours as needed. 50 tablet 0    pantoprazole (PROTONIX) 40 MG tablet       pimecrolimus (ELIDEL) 1 % cream 1 application every evening. Apply to face      pregabalin (LYRICA) 50 MG capsule       spironolactone (ALDACTONE) 100 MG tablet       tamsulosin (FLOMAX) 0.4 mg Cap       traMADol (ULTRAM) 50 mg tablet       ursodiol (ACTIGALL) 300 mg capsule        No current facility-administered medications for this visit.

## 2025-07-28 ENCOUNTER — LAB VISIT (OUTPATIENT)
Dept: LAB | Facility: HOSPITAL | Age: 75
End: 2025-07-28
Attending: INTERNAL MEDICINE
Payer: MEDICARE

## 2025-07-28 DIAGNOSIS — C22.1 CHOLANGIOCARCINOMA: ICD-10-CM

## 2025-07-28 LAB
ABSOLUTE NEUTROPHIL MANUAL (OHS): 0.8 K/UL
BASOPHILS NFR BLD MANUAL: 1 %
EOSINOPHIL NFR BLD MANUAL: 7 % (ref 0–8)
ERYTHROCYTE [DISTWIDTH] IN BLOOD BY AUTOMATED COUNT: 15.5 % (ref 11.5–14.5)
HCT VFR BLD AUTO: 42.3 % (ref 40–54)
HGB BLD-MCNC: 13.3 GM/DL (ref 14–18)
LYMPHOCYTES NFR BLD MANUAL: 20 % (ref 18–48)
MCH RBC QN AUTO: 26.9 PG (ref 27–31)
MCHC RBC AUTO-ENTMCNC: 31.4 G/DL (ref 32–36)
MCV RBC AUTO: 86 FL (ref 82–98)
METAMYELOCYTES NFR BLD MANUAL: 1 %
MONOCYTES NFR BLD MANUAL: 30 % (ref 4–15)
NEUTROPHILS NFR BLD MANUAL: 41 % (ref 38–73)
NUCLEATED RBC (/100WBC) (OHS): 0 /100 WBC
PLATELET # BLD AUTO: 53 K/UL (ref 150–450)
PMV BLD AUTO: 11.9 FL (ref 9.2–12.9)
RBC # BLD AUTO: 4.94 M/UL (ref 4.6–6.2)
WBC # BLD AUTO: 1.89 K/UL (ref 3.9–12.7)

## 2025-07-28 PROCEDURE — 36415 COLL VENOUS BLD VENIPUNCTURE: CPT

## 2025-07-28 PROCEDURE — 85007 BL SMEAR W/DIFF WBC COUNT: CPT

## 2025-08-01 ENCOUNTER — TELEPHONE (OUTPATIENT)
Dept: HEMATOLOGY/ONCOLOGY | Facility: CLINIC | Age: 75
End: 2025-08-01
Payer: MEDICARE

## 2025-08-04 ENCOUNTER — OFFICE VISIT (OUTPATIENT)
Dept: HEMATOLOGY/ONCOLOGY | Facility: CLINIC | Age: 75
End: 2025-08-04
Payer: MEDICARE

## 2025-08-04 VITALS
HEART RATE: 72 BPM | TEMPERATURE: 97 F | OXYGEN SATURATION: 97 % | SYSTOLIC BLOOD PRESSURE: 125 MMHG | WEIGHT: 172.19 LBS | BODY MASS INDEX: 24.01 KG/M2 | DIASTOLIC BLOOD PRESSURE: 60 MMHG

## 2025-08-04 DIAGNOSIS — R97.8 ELEVATED CA 19-9 LEVEL: ICD-10-CM

## 2025-08-04 DIAGNOSIS — K76.9 CHRONIC LIVER DISEASE: ICD-10-CM

## 2025-08-04 DIAGNOSIS — D73.2 SPLENOMEGALY, CONGESTIVE, CHRONIC: ICD-10-CM

## 2025-08-04 DIAGNOSIS — C22.1 CHOLANGIOCARCINOMA: ICD-10-CM

## 2025-08-04 DIAGNOSIS — D46.Z MYELODYSPLASIA PRESENT IN BONE MARROW: Primary | ICD-10-CM

## 2025-08-04 DIAGNOSIS — Z71.89 OTHER SPECIFIED COUNSELING: ICD-10-CM

## 2025-08-04 DIAGNOSIS — D61.818 PANCYTOPENIA: ICD-10-CM

## 2025-08-04 DIAGNOSIS — Z85.09 HISTORY OF CHOLANGIOCARCINOMA: Primary | ICD-10-CM

## 2025-08-04 PROCEDURE — 99999 PR PBB SHADOW E&M-EST. PATIENT-LVL IV: CPT | Mod: PBBFAC,,, | Performed by: INTERNAL MEDICINE

## 2025-08-04 PROCEDURE — 1101F PT FALLS ASSESS-DOCD LE1/YR: CPT | Mod: CPTII,S$GLB,, | Performed by: INTERNAL MEDICINE

## 2025-08-04 PROCEDURE — 3008F BODY MASS INDEX DOCD: CPT | Mod: CPTII,S$GLB,, | Performed by: INTERNAL MEDICINE

## 2025-08-04 PROCEDURE — 3078F DIAST BP <80 MM HG: CPT | Mod: CPTII,S$GLB,, | Performed by: INTERNAL MEDICINE

## 2025-08-04 PROCEDURE — 1126F AMNT PAIN NOTED NONE PRSNT: CPT | Mod: CPTII,S$GLB,, | Performed by: INTERNAL MEDICINE

## 2025-08-04 PROCEDURE — 3288F FALL RISK ASSESSMENT DOCD: CPT | Mod: CPTII,S$GLB,, | Performed by: INTERNAL MEDICINE

## 2025-08-04 PROCEDURE — 99214 OFFICE O/P EST MOD 30 MIN: CPT | Mod: S$GLB,,, | Performed by: INTERNAL MEDICINE

## 2025-08-04 PROCEDURE — 1159F MED LIST DOCD IN RCRD: CPT | Mod: CPTII,S$GLB,, | Performed by: INTERNAL MEDICINE

## 2025-08-04 PROCEDURE — 1160F RVW MEDS BY RX/DR IN RCRD: CPT | Mod: CPTII,S$GLB,, | Performed by: INTERNAL MEDICINE

## 2025-08-04 PROCEDURE — 3074F SYST BP LT 130 MM HG: CPT | Mod: CPTII,S$GLB,, | Performed by: INTERNAL MEDICINE
